# Patient Record
Sex: FEMALE | Race: WHITE | NOT HISPANIC OR LATINO | Employment: OTHER | URBAN - METROPOLITAN AREA
[De-identification: names, ages, dates, MRNs, and addresses within clinical notes are randomized per-mention and may not be internally consistent; named-entity substitution may affect disease eponyms.]

---

## 2017-07-06 ENCOUNTER — OFFICE VISIT (OUTPATIENT)
Dept: FAMILY MEDICINE | Facility: CLINIC | Age: 72
End: 2017-07-06

## 2017-07-06 VITALS
SYSTOLIC BLOOD PRESSURE: 132 MMHG | HEIGHT: 61 IN | WEIGHT: 140 LBS | HEART RATE: 90 BPM | DIASTOLIC BLOOD PRESSURE: 80 MMHG | OXYGEN SATURATION: 98 % | TEMPERATURE: 97.8 F | RESPIRATION RATE: 18 BRPM | BODY MASS INDEX: 26.43 KG/M2

## 2017-07-06 DIAGNOSIS — I10 ESSENTIAL HYPERTENSION WITH GOAL BLOOD PRESSURE LESS THAN 140/90: ICD-10-CM

## 2017-07-06 DIAGNOSIS — R73.03 PREDIABETES: ICD-10-CM

## 2017-07-06 DIAGNOSIS — E78.00 HYPERCHOLESTEREMIA: ICD-10-CM

## 2017-07-06 DIAGNOSIS — H93.8X2 EAR CONGESTION, LEFT: ICD-10-CM

## 2017-07-06 DIAGNOSIS — Z11.59 NEED FOR HEPATITIS C SCREENING TEST: ICD-10-CM

## 2017-07-06 DIAGNOSIS — Z12.11 SPECIAL SCREENING FOR MALIGNANT NEOPLASMS, COLON: Primary | ICD-10-CM

## 2017-07-06 DIAGNOSIS — Z23 NEED FOR PNEUMOCOCCAL VACCINE: ICD-10-CM

## 2017-07-06 PROCEDURE — 99213 OFFICE O/P EST LOW 20 MIN: CPT | Mod: 25 | Performed by: FAMILY MEDICINE

## 2017-07-06 PROCEDURE — G0009 ADMIN PNEUMOCOCCAL VACCINE: HCPCS | Performed by: FAMILY MEDICINE

## 2017-07-06 PROCEDURE — 90732 PPSV23 VACC 2 YRS+ SUBQ/IM: CPT | Performed by: FAMILY MEDICINE

## 2017-07-06 PROCEDURE — 36415 COLL VENOUS BLD VENIPUNCTURE: CPT | Performed by: FAMILY MEDICINE

## 2017-07-06 NOTE — PATIENT INSTRUCTIONS
Labs today    Your left ear lateralized tuning fork  Screen tone test left ear failed 500 Hz. Schedule with audiology if not improved 2-4 weeks after ear wash  Ear wash at your request left ear

## 2017-07-06 NOTE — PROGRESS NOTES
Cc check ears, labs lipid and a1c      SUBJECTIVE:                                                    Tess Menchaca is a 71 year old female who presents to clinic today for the following health issues:      Left ear plugged her  a retired physician thought there was wax there. I did not see much. We did do ear lavage and she thought she could hear better after that. Prior to that her left ear failed at 500 Hz and tuning fork lateralized to that side. If she continues to have issues she could schedule with the audiologist here. No Tinnitus reported.     Saw Travis in November and lisinopril was increased  BP Readings from Last 3 Encounters:   07/06/17 132/80   11/04/16 138/82   07/25/16 138/72     HCM  Hep C screen- done  Colon cancer screen referred  PPSV23- done      Last Basic Metabolic Panel:  Lab Results   Component Value Date     07/25/2016      Lab Results   Component Value Date    POTASSIUM 4.8 07/25/2016     Lab Results   Component Value Date    CHLORIDE 101 07/25/2016     Lab Results   Component Value Date    KYLE 9.5 07/25/2016     Lab Results   Component Value Date    CO2 22 01/04/2012     Lab Results   Component Value Date    BUN 18 07/25/2016    BUN 25 07/25/2016     Lab Results   Component Value Date    CR 0.73 07/25/2016     Lab Results   Component Value Date     07/25/2016     LDL Cholesterol Calculated   Date Value Ref Range Status   11/09/2016 213 (H) 0 - 99 mg/dL Final   ]  Lab Results   Component Value Date    A1C 5.9 11/09/2016    A1C 5.6 06/18/2005             Problem list and histories reviewed & adjusted, as indicated.  Additional history: as documented    Patient Active Problem List   Diagnosis     Hypercholesteremia     Advance Care Planning     S/P laparoscopy     S/P endometrial ablation     Ovarian cyst     Essential hypertension with goal blood pressure less than 140/90     Prediabetes     Past Surgical History:   Procedure Laterality Date     ARTHROSCOPY KNEE       right     BREAST SURGERY      aspiration right brest - cysts     COLONOSCOPY       DILATION AND CURETTAGE, OPERATIVE HYSTEROSCOPY, COMBINED  6/21/2012    Procedure: COMBINED DILATION AND CURETTAGE, OPERATIVE HYSTEROSCOPY;;  Surgeon: Mayco Seth MD;  Location: [facility]     LAPAROSCOPIC SALPINGO-OOPHORECTOMY  6/21/2012    Procedure: LAPAROSCOPIC SALPINGO-OOPHORECTOMY;  PELVISCOPY, BILATERAL SALPINGO OOPHORECTOMY, DIAGNOSTIC HYSTEROSCOPY WITH THERMACHOICE ABLATION;  Surgeon: Mayco Seth MD;  Location: [facility]     MOHS MICROGRAPHIC PROCEDURE       RELEASE CARPAL TUNNEL      bilateral       Social History   Substance Use Topics     Smoking status: Never Smoker     Smokeless tobacco: Never Used     Alcohol use 3.5 oz/week     7 Glasses of wine per week      Comment: 8 - 14 drinks per week     No family history on file.      Current Outpatient Prescriptions   Medication Sig Dispense Refill     FLUVIRIN SUSP ADM 0.5ML IM UTD  0     lisinopril (PRINIVIL,ZESTRIL) 10 MG tablet Take 1 tablet (10 mg) by mouth daily 30 tablet 11     simvastatin (ZOCOR) 40 MG tablet Take 1 tablet (40 mg) by mouth At Bedtime 90 tablet 3     VITAMIN D, CHOLECALCIFEROL, PO Take 5,000 Units by mouth daily       estradiol (VAGIFEM) 10 MCG TABS Place 10 mcg vaginally Insert one 4x per week       conjugated estrogens (PREMARIN) vaginal cream Place  vaginally twice a week.         raloxifene (EVISTA) 60 MG tablet Take 60 mg by mouth At Bedtime.       aspirin (SB LOW DOSE ASA EC) 81 MG EC tablet Take 81 mg by mouth At Bedtime.       Multiple Vitamin (DAILY MULTIVITAMIN PO) Take 1 tablet by mouth daily.       No Known Allergies  Recent Labs   Lab Test  11/09/16   0914  07/25/16   1324  04/04/16   1102  01/04/12   0000   A1C  5.9*   --    --    --    LDL  213*   --   137*  116   HDL  69   --   73  74   TRIG  105   --   104  113   ALT   --    --   20   --    CR   --   0.73  0.76  0.8   POTASSIUM   --   4.8  4.9  4.6      BP Readings from Last 3  "Encounters:   07/06/17 132/80   11/04/16 138/82   07/25/16 138/72    Wt Readings from Last 3 Encounters:   07/06/17 63.5 kg (140 lb)   11/04/16 64 kg (141 lb)   07/25/16 65.8 kg (145 lb)       Estimated body mass index is 26.45 kg/(m^2) as calculated from the following:    Height as of this encounter: 1.549 m (5' 1\").    Weight as of this encounter: 63.5 kg (140 lb).             Labs reviewed in EPIC    Reviewed and updated as needed this visit by clinical staff       Reviewed and updated as needed this visit by Provider         ROS:  Constitutional, HEENT, cardiovascular, pulmonary, GI, , musculoskeletal, neuro, skin, endocrine and psych systems are negative, except as otherwise noted.    OBJECTIVE:     /80  Pulse 90  Temp 97.8  F (36.6  C) (Oral)  Resp 18  Ht 1.549 m (5' 1\")  Wt 63.5 kg (140 lb)  SpO2 98%  BMI 26.45 kg/m2  Body mass index is 26.45 kg/(m^2).  GENERAL: healthy, alert and no distress  EYES: Eyes grossly normal to inspection, PERRL and conjunctivae and sclerae normal  HENT: ear canals and TM's normal on Right and a little cerumen by appearance on the left, nose and mouth without ulcers or lesions  Air/Bone conduction by tuning fork testing. Lateralized to left ear. Left ear failed screen at 500 Hz before ear wash.  NECK: no adenopathy, no asymmetry, masses, or scars and thyroid normal to palpation  RESP: lungs clear to auscultation - no rales, rhonchi or wheezes  CV: regular rate and rhythm, normal S1 S2, no S3 or S4, no murmur, click or rub, no peripheral edema and peripheral pulses strong  ABDOMEN: soft, nontender, no hepatosplenomegaly, no masses and bowel sounds normal  MS: no gross musculoskeletal defects noted, no edema  SKIN: no suspicious lesions or rashes  NEURO: Normal strength and tone, mentation intact and speech normal  BACK: no CVA tenderness, no paralumbar tenderness  PSYCH: mentation appears normal, affect normal/bright  LYMPH: no cervical, supraclavicular, axillary, or " inguinal adenopathy    Diagnostic Test Results:  Results for orders placed or performed in visit on 11/09/16   Hemoglobin A1C ([facility])   Result Value Ref Range    Hemoglobin A1C 5.9 (H) 4.8 - 5.6 %    Narrative    Performed at:  01 - [facility] [address], [place], [place]  [id number]  : Prince Jimenez MD, Phone:  [phone]   Lipid Panel ([facility])   Result Value Ref Range    Cholesterol 303 (H) 100 - 199 mg/dL    Triglycerides 105 0 - 149 mg/dL    HDL Cholesterol 69 >39 mg/dL    VLDL Cholesterol Wili 21 5 - 40 mg/dL    LDL Cholesterol Calculated 213 (H) 0 - 99 mg/dL    Comment: Comment     LDL/HDL Ratio 3.1 0.0 - 3.2 ratio units    Narrative    Performed at:  01 - [facility] [address], [place], [place]  [id number]  : Prince Jimenez MD, Phone:  [phone]       ASSESSMENT/PLAN:     ASSESSMENT / PLAN:  (Z12.11) Special screening for malignant neoplasms, colon  (primary encounter diagnosis)  Comment: routine  Plan: GASTROENTEROLOGY ADULT REF PROCEDURE ONLY            (Z23) Need for pneumococcal vaccine  Comment: routine  Plan: PNEUMOCOCCAL VACCINE,ADULT,SQ OR IM            (E78.00) Hypercholesteremia  Comment:   Plan: Lipid Panel ([facility]), VENOUS COLLECTION            (R73.03) Prediabetes  Comment:   Plan: Hemoglobin A1C ([facility]), VENOUS COLLECTION            (I10) Essential hypertension with goal blood pressure less than 140/90  Comment:   BP Readings from Last 3 Encounters:   07/06/17 132/80   11/04/16 138/82   07/25/16 138/72       Plan: Basic Metabolic Panel (8) ([facility]), VENOUS         COLLECTION            (H93.8X2) Ear congestion, left  Comment: she thought she could hear better after lavage  Plan: see audiology if hearing concern continues after lavage    (Z11.59) Need for hepatitis C screening test  Comment:   Plan: HCV Antibody ([facility]), VENOUS COLLECTION                Patient Instructions   Labs today    Your left ear lateralized tuning fork  Screen tone test left  ear failed 500 Hz. Schedule with audiology if not improved 2-4 weeks after ear wash  Ear wash at your request left ear          Roselia Garza MD  [facility]

## 2017-07-06 NOTE — MR AVS SNAPSHOT
After Visit Summary   7/6/2017    Tess Menchaca    MRN: [id number]           Patient Information     Date Of Birth          1945        Visit Information        Provider Department      7/6/2017 9:45 AM Roselia Garza MD [facility]        Today's Diagnoses     Special screening for malignant neoplasms, colon    -  1    Need for pneumococcal vaccine        Hypercholesteremia        Prediabetes        Essential hypertension with goal blood pressure less than 140/90        Ear congestion, left        Need for hepatitis C screening test          Care Instructions    Labs today    Your left ear lateralized tuning fork  Screen tone test left ear failed 500 Hz. Schedule with audiology if not improved 2-4 weeks after ear wash  Ear wash at your request left ear              Follow-ups after your visit        Additional Services     GASTROENTEROLOGY ADULT REF PROCEDURE ONLY       Last Lab Result: Creatinine (mg/dL)       Date                     Value                 07/25/2016               0.73             ----------  There is no height or weight on file to calculate BMI.     Needed:  No  Language:  English    Patient will be contacted to schedule procedure.     Please be aware that coverage of these services is subject to the terms and limitations of your health insurance plan.  Call member services at your health plan with any benefit or coverage questions.  Any procedures must be performed at a [facility] facility OR coordinated by your clinic's referral office.    Please bring the following with you to your appointment:    (1) Any X-Rays, CTs or MRIs which have been performed.  Contact the facility where they were done to arrange for  prior to your scheduled appointment.    (2) List of current medications   (3) This referral request   (4) Any documents/labs given to you for this referral                  Who to contact     If you have questions or need follow up  "information about today's clinic visit or your schedule please contact [facility] directly at [phone].  Normal or non-critical lab and imaging results will be communicated to you by [employer], letter or phone within 4 business days after the clinic has received the results. If you do not hear from us within 7 days, please contact the clinic through T[employer] or phone. If you have a critical or abnormal lab result, we will notify you by phone as soon as possible.  Submit refill requests through [employer] or call your pharmacy and they will forward the refill request to us. Please allow 3 business days for your refill to be completed.          Additional Information About Your Visit        T[employer]ays[employer] Information     Ginx lets you send messages to your doctor, view your test results, renew your prescriptions, schedule appointments and more. To sign up, go to [email or web address] . Click on \"Log in\" on the left side of the screen, which will take you to the Welcome page. Then click on \"Sign up Now\" on the right side of the page.     You will be asked to enter the access code listed below, as well as some personal information. Please follow the directions to create your username and password.     Your access code is: [email or web address]  Expires: 10/4/2017 10:32 AM     Your access code will  in 90 days. If you need help or a new code, please call your [facility] clinic or [phone].        Care EveryWhere ID     This is your Care EveryWhere ID. This could be used by other organizations to access your [facility] medical records  [id number]        Your Vitals Were     Pulse Temperature Respirations Height Pulse Oximetry BMI (Body Mass Index)    90 97.8  F (36.6  C) (Oral) 18 1.549 m (5' 1\") 98% 26.45 kg/m2       Blood Pressure from Last 3 Encounters:   17 132/80   16 138/82   16 138/72    Weight from Last 3 Encounters:   17 63.5 kg (140 lb)   16 64 kg (141 lb)   16 65.8 kg " (145 lb)              We Performed the Following     Basic Metabolic Panel (8) ([facility])     GASTROENTEROLOGY ADULT REF PROCEDURE ONLY     HCV Antibody ([facility])     Hemoglobin A1C ([facility])     Lipid Panel ([facility])     PNEUMOCOCCAL VACCINE,ADULT,SQ OR IM        Primary Care Provider Office Phone # Fax #    Gabriel Robles [phone]       [facility] [address]  [facility] [phone]        Equal Access to Services     FLORECITA LEAHY : Hadii aad ku hadasho Soomaali, waaxda luqadaha, qaybta kaalmada adeegyada, waxay idiin hayaan adeeg kharash la'aan . So [facility] [phone].    ATENCIÓN: Si habla español, tiene a dhaliwal disposición servicios gratuitos de asistencia lingüística. Ll[facility] [phone].    We comply with applicable federal civil rights laws and [place] laws. We do not discriminate on the basis of race, color, national origin, age, disability sex, sexual orientation or gender identity.            Thank you!     Thank you for choosing [facility]  for your care. Our goal is always to provide you with excellent care. Hearing back from our patients is one way we can continue to improve our services. Please take a few minutes to complete the written survey that you may receive in the mail after your visit with us. Thank you!             Your Updated Medication List - Protect others around you: Learn how to safely use, store and throw away your medicines at www.disposemymeds.org.          This list is accurate as of: 7/6/17 10:37 AM.  Always use your most recent med list.                   Brand Name Dispense Instructions for use Diagnosis    conjugated estrogens cream    PREMARIN     Place  vaginally twice a week.        DAILY MULTIVITAMIN PO      Take 1 tablet by mouth daily.        estradiol 10 MCG Tabs vaginal tablet    VAGIFEM     Place 10 mcg vaginally Insert one 4x per week        EVISTA 60 MG tablet   Generic drug:  raloxifene      Take 60 mg by  mouth At Bedtime.        FLUVIRIN Susp   Generic drug:  Influenza Vac Typ A&B Surf Ant      ADM 0.5ML IM UTD        lisinopril 10 MG tablet    PRINIVIL/ZESTRIL    30 tablet    Take 1 tablet (10 mg) by mouth daily    Benign essential hypertension       SB LOW DOSE ASA EC 81 MG EC tablet   Generic drug:  aspirin      Take 81 mg by mouth At Bedtime.        simvastatin 40 MG tablet    ZOCOR    90 tablet    Take 1 tablet (40 mg) by mouth At Bedtime    Hypercholesteremia       VITAMIN D (CHOLECALCIFEROL) PO      Take 5,000 Units by mouth daily

## 2017-07-07 LAB — HBA1C MFR BLD: 6.1 % (ref 4.8–5.6)

## 2017-07-08 LAB
BUN SERPL-MCNC: 17 MG/DL (ref 8–27)
BUN/CREATININE RATIO: 21 (ref 12–28)
CALCIUM SERPL-MCNC: 9.9 MG/DL (ref 8.7–10.3)
CHLORIDE SERPLBLD-SCNC: 103 MMOL/L (ref 96–106)
CHOLEST SERPL-MCNC: 221 MG/DL (ref 100–199)
CREAT SERPL-MCNC: 0.82 MG/DL (ref 0.57–1)
EGFR IF AFRICN AM: 84 ML/MIN/1.73
EGFR IF NONAFRICN AM: 73 ML/MIN/1.73
GLUCOSE SERPL-MCNC: 106 MG/DL (ref 65–99)
HCV AB SERPL QL IA: 0.1 S/CO RATIO (ref 0–0.9)
HDLC SERPL-MCNC: 83 MG/DL
LDL/HDL RATIO: 1.5 RATIO UNITS (ref 0–3.2)
LDLC SERPL CALC-MCNC: 122 MG/DL (ref 0–99)
POTASSIUM SERPL-SCNC: 6.1 MMOL/L (ref 3.5–5.2)
SODIUM SERPL-SCNC: 142 MMOL/L (ref 134–144)
TOTAL CO2: 24 MMOL/L (ref 18–28)
TRIGL SERPL-MCNC: 78 MG/DL (ref 0–149)
VLDLC SERPL CALC-MCNC: 16 MG/DL (ref 5–40)

## 2017-07-11 ENCOUNTER — TELEPHONE (OUTPATIENT)
Dept: FAMILY MEDICINE | Facility: CLINIC | Age: 72
End: 2017-07-11

## 2017-07-11 DIAGNOSIS — E87.5 SERUM POTASSIUM ELEVATED: Primary | ICD-10-CM

## 2017-07-11 DIAGNOSIS — R73.03 PRE-DIABETES: ICD-10-CM

## 2017-07-11 NOTE — TELEPHONE ENCOUNTER
Called patient with lab results.  Informed her of Hep C negative and lipids mildly elevated but better than last draw .  A1C is increased and patient will avoid sugars and carbs and foster in 3 months.  Also potassium was high.  Patient will foster tomorrow as leaving for out of town Thursday.

## 2017-07-12 DIAGNOSIS — E87.5 SERUM POTASSIUM ELEVATED: ICD-10-CM

## 2017-07-12 PROCEDURE — 36415 COLL VENOUS BLD VENIPUNCTURE: CPT | Performed by: FAMILY MEDICINE

## 2017-07-12 NOTE — LETTER
[facility] [address] [place]  [address]  Phone: [phone]    July 14, 2017      Tess Menchaca  [address] [place] [phone]              Dear Tess,    Please call with results, which are within the range we expected. The patient may continue her current plan of care.         Sincerely,     Gabriel Robles M.D.    Results for orders placed or performed in visit on 07/12/17   Potassium  Serum ([facility])   Result Value Ref Range    Potassium 5.1 3.5 - 5.2 mmol/L    Narrative    Performed at:  01 - [facility] [address], [place], [place]  [id number]  : Prince Jimenez MD, Phone:  [phone]

## 2017-07-13 LAB — POTASSIUM SERPL-SCNC: 5.1 MMOL/L (ref 3.5–5.2)

## 2017-09-06 DIAGNOSIS — E78.00 HYPERCHOLESTEREMIA: ICD-10-CM

## 2017-09-07 RX ORDER — SIMVASTATIN 40 MG
TABLET ORAL
Qty: 90 TABLET | Refills: 0 | Status: SHIPPED | OUTPATIENT
Start: 2017-09-07 | End: 2017-11-07

## 2017-09-26 ENCOUNTER — TRANSFERRED RECORDS (OUTPATIENT)
Dept: FAMILY MEDICINE | Facility: CLINIC | Age: 72
End: 2017-09-26

## 2017-10-12 DIAGNOSIS — I10 BENIGN ESSENTIAL HYPERTENSION: ICD-10-CM

## 2017-10-12 RX ORDER — LISINOPRIL 10 MG/1
TABLET ORAL
Qty: 90 TABLET | Refills: 5 | Status: SHIPPED | OUTPATIENT
Start: 2017-10-12 | End: 2017-11-07

## 2017-10-23 DIAGNOSIS — R73.03 PRE-DIABETES: ICD-10-CM

## 2017-10-24 LAB — HBA1C MFR BLD: 5.7 % (ref 4.8–5.6)

## 2017-11-07 ENCOUNTER — OFFICE VISIT (OUTPATIENT)
Dept: FAMILY MEDICINE | Facility: CLINIC | Age: 72
End: 2017-11-07

## 2017-11-07 VITALS
DIASTOLIC BLOOD PRESSURE: 70 MMHG | SYSTOLIC BLOOD PRESSURE: 124 MMHG | HEART RATE: 100 BPM | RESPIRATION RATE: 16 BRPM | OXYGEN SATURATION: 97 % | WEIGHT: 139.4 LBS | HEIGHT: 61 IN | BODY MASS INDEX: 26.32 KG/M2

## 2017-11-07 DIAGNOSIS — E78.00 HYPERCHOLESTEREMIA: ICD-10-CM

## 2017-11-07 DIAGNOSIS — N91.2 ABSENCE OF MENSTRUATION: ICD-10-CM

## 2017-11-07 DIAGNOSIS — R73.03 PREDIABETES: ICD-10-CM

## 2017-11-07 DIAGNOSIS — I10 BENIGN ESSENTIAL HYPERTENSION: ICD-10-CM

## 2017-11-07 DIAGNOSIS — I10 ESSENTIAL HYPERTENSION WITH GOAL BLOOD PRESSURE LESS THAN 140/90: Primary | ICD-10-CM

## 2017-11-07 PROCEDURE — 99213 OFFICE O/P EST LOW 20 MIN: CPT | Performed by: FAMILY MEDICINE

## 2017-11-07 RX ORDER — RALOXIFENE HYDROCHLORIDE 60 MG/1
60 TABLET, FILM COATED ORAL AT BEDTIME
Qty: 90 TABLET | Refills: 3 | Status: SHIPPED | OUTPATIENT
Start: 2017-11-07 | End: 2018-11-16

## 2017-11-07 RX ORDER — SIMVASTATIN 40 MG
TABLET ORAL
Qty: 90 TABLET | Refills: 3 | Status: SHIPPED | OUTPATIENT
Start: 2017-11-07 | End: 2018-11-16

## 2017-11-07 RX ORDER — LISINOPRIL 10 MG/1
TABLET ORAL
Qty: 90 TABLET | Refills: 3 | Status: SHIPPED | OUTPATIENT
Start: 2017-11-07 | End: 2018-11-16

## 2017-11-07 NOTE — PROGRESS NOTES
Problem(s) Oriented visit        SUBJECTIVE:                                                    Tess Menchaca is a 72 year old female who presents to clinic today for the following health issues :    1. Essential hypertension with goal blood pressure less than 140/90  Blood presure remains well controlled when checked out of clinic.    Reviewed last 6 BP readings in chart:  BP Readings from Last 6 Encounters:   11/07/17 124/70   07/06/17 132/80   11/04/16 138/82   07/25/16 138/72   07/14/16 162/90   04/04/16 128/78       she has not experienced any significant side effects from medications for hypertension.    NO active cardiac complaints or symptoms with exercise.    2. Prediabetes  a1c down to 5.7!!         Problem list, Medication list, Allergies, and Medical/Social/Surgical histories reviewed in EPIC and updated as appropriate.   Additional history: as documented    ROS:  General and CV completed and negative except as noted above    Histories:   Patient Active Problem List   Diagnosis     Hypercholesteremia     Advance Care Planning     S/P laparoscopy     S/P endometrial ablation     Ovarian cyst     Essential hypertension with goal blood pressure less than 140/90     Prediabetes     Past Surgical History:   Procedure Laterality Date     ARTHROSCOPY KNEE      right     BREAST SURGERY      aspiration right brest - cysts     COLONOSCOPY       DILATION AND CURETTAGE, OPERATIVE HYSTEROSCOPY, COMBINED  6/21/2012    Procedure: COMBINED DILATION AND CURETTAGE, OPERATIVE HYSTEROSCOPY;;  Surgeon: Mayco Seth MD;  Location: [facility]     LAPAROSCOPIC SALPINGO-OOPHORECTOMY  6/21/2012    Procedure: LAPAROSCOPIC SALPINGO-OOPHORECTOMY;  PELVISCOPY, BILATERAL SALPINGO OOPHORECTOMY, DIAGNOSTIC HYSTEROSCOPY WITH THERMACHOICE ABLATION;  Surgeon: Mayco Seth MD;  Location: [facility]     MOHS MICROGRAPHIC PROCEDURE       RELEASE CARPAL TUNNEL      bilateral       Social History   Substance Use Topics     Smoking  "status: Never Smoker     Smokeless tobacco: Never Used     Alcohol use 3.5 oz/week     7 Glasses of wine per week      Comment: 8 - 14 drinks per week     No family history on file.        OBJECTIVE:                                                    /70  Pulse 100  Resp 16  Ht 1.549 m (5' 1\")  Wt 63.2 kg (139 lb 6.4 oz)  SpO2 97%  BMI 26.34 kg/m2  Body mass index is 26.34 kg/(m^2).   APPEARANCE: = Relaxed and in no distress  Conj/Eyelids = noninjected and lids and lashes are without inflammation  PERRLA/Irises = Pupils Round Reactive to Light and Irisis without inflammation  Neck = No anterior or posterior adenopathy appreciated.  Thyroid = Not enlarged and no masses felt  Resp effort = Calm regular breathing  Breath Sounds = Good air movement with no rales or rhonchi in any lung fields  Heart Rate, Rythym, & sounds (no Murm)  = Regular rate and rythym with no S3, S4, or murmer appreciated.  Carotid Art's = Pulses full and equal and no bruits appreciated  Abdomen = Soft, nontender, no masses, & bowel sounds in all quadrants  Liver/Spleen = Normal span and no splenomegaly noted  Digits and Nails = FROM in all finger joints, no nail dystrophy  Ext (edema) = No pretibial edema noted or elsewhere  Musculsktl =  Strength and ROM of major joints are within normal limits  SKIN = absent significant rashes or lesions   Recent/Remote Memory = Alert and Oriented x 3  Mood/Affect = Cooperative and interested     ASSESSMENT/PLAN:                                                    Assessment/Plan:  Tess was seen today for recheck medication and hypertension.    Diagnoses and all orders for this visit:    Essential hypertension with goal blood pressure less than 140/90    Prediabetes    Hypercholesteremia  -     simvastatin (ZOCOR) 40 MG tablet; TAKE 1 TABLET(40 MG) BY MOUTH AT BEDTIME    Benign essential hypertension  -     lisinopril (PRINIVIL/ZESTRIL) 10 MG tablet; TAKE 1 TABLET(10 MG) BY MOUTH DAILY    Absence of " menstruation  -     raloxifene (EVISTA) 60 MG tablet; Take 1 tablet (60 mg) by mouth At Bedtime      Gabriel Robles MD  [facility]  [phone]                The following health maintenance items are reviewed in Epic and correct as of today:  Health Maintenance   Topic Date Due     COLON CANCER SCREEN (SYSTEM ASSIGNED)  08/14/1996     FALL RISK ASSESSMENT  04/04/2017     INFLUENZA VACCINE (SYSTEM ASSIGNED)  09/01/2017     MAMMO SCREEN Q2 YR (SYSTEM ASSIGNED)  09/26/2019     ADVANCE DIRECTIVE PLANNING Q5 YRS  09/09/2021     LIPID SCREEN Q5 YR FEMALE (SYSTEM ASSIGNED)  07/06/2022     TETANUS IMMUNIZATION (SYSTEM ASSIGNED)  04/04/2026     DEXA SCAN SCREENING (SYSTEM ASSIGNED)  Completed     PNEUMOCOCCAL  Completed     HEPATITIS C SCREENING  Completed       Gabriel Robles MD  [facility]  [phone]    For any issues my office # is [phone]

## 2017-11-07 NOTE — MR AVS SNAPSHOT
"              After Visit Summary   2017    Tess Menchaca    MRN: [id number]           Patient Information     Date Of Birth          1945        Visit Information        Provider Department      2017 2:00 PM Gabriel Robles MD [facility]        Today's Diagnoses     Essential hypertension with goal blood pressure less than 140/90    -  1    Prediabetes        Hypercholesteremia        Benign essential hypertension        Absence of menstruation           Follow-ups after your visit        Who to contact     If you have questions or need follow up information about today's clinic visit or your schedule please contact [facility] directly at [phone].  Normal or non-critical lab and imaging results will be communicated to you by [employer], letter or phone within 4 business days after the clinic has received the results. If you do not hear from us within 7 days, please contact the clinic through [employer] or phone. If you have a critical or abnormal lab result, we will notify you by phone as soon as possible.  Submit refill requests through GetIntent or call your pharmacy and they will forward the refill request to us. Please allow 3 business days for your refill to be completed.          Additional Information About Your Visit        MyChart Information     GetIntent lets you send messages to your doctor, view your test results, renew your prescriptions, schedule appointments and more. To sign up, go to [email or web address] . Click on \"Log in\" on the left side of the screen, which will take you to the Welcome page. Then click on \"Sign up Now\" on the right side of the page.     You will be asked to enter the access code listed below, as well as some personal information. Please follow the directions to create your username and password.     Your access code is: [email or web address]  Expires: 2018  2:56 PM     Your access code will  in 90 days. If you need help or a new " "code, please call your [place] clinic or [phone].        Care EveryWhere ID     This is your Care EveryWhere ID. This could be used by other organizations to access your [place] medical records  [id number]        Your Vitals Were     Pulse Respirations Height Pulse Oximetry BMI (Body Mass Index)       100 16 1.549 m (5' 1\") 97% 26.34 kg/m2        Blood Pressure from Last 3 Encounters:   11/07/17 124/70   07/06/17 132/80   11/04/16 138/82    Weight from Last 3 Encounters:   11/07/17 63.2 kg (139 lb 6.4 oz)   07/06/17 63.5 kg (140 lb)   11/04/16 64 kg (141 lb)              Today, you had the following     No orders found for display         Today's Medication Changes          These changes are accurate as of: 11/7/17  2:56 PM.  If you have any questions, ask your nurse or doctor.               These medicines have changed or have updated prescriptions.        Dose/Directions    lisinopril 10 MG tablet   Commonly known as:  PRINIVIL/ZESTRIL   This may have changed:  See the new instructions.   Used for:  Benign essential hypertension   Changed by:  Gabriel Robles MD        TAKE 1 TABLET(10 MG) BY MOUTH DAILY   Quantity:  90 tablet   Refills:  3       simvastatin 40 MG tablet   Commonly known as:  ZOCOR   This may have changed:  See the new instructions.   Used for:  Hypercholesteremia   Changed by:  Gabriel Robles MD        TAKE 1 TABLET(40 MG) BY MOUTH AT BEDTIME   Quantity:  90 tablet   Refills:  3            Where to get your medicines      These medications were sent to [facility] [address] [place], [place] - [address] AT [facility] [address], [facility] [address]     Phone:  [phone]     lisinopril 10 MG tablet    simvastatin 40 MG tablet         Some of these will need a paper prescription and others can be bought over the counter.  Ask your nurse if you have questions.     Bring a paper prescription for each of these medications     raloxifene 60 " MG tablet                Primary Care Provider Office Phone # Fax #    Gabriel Robles -[phone] [address]  [facility] [address]        Equal Access to Services     FLORECITA LEAHY : Freedom guevara ku verao Sosuzanne, waaxda luqadaha, qaybta kaalmada adeegyada, kristie michelle laSteffanyanh christensen. So [facility] [phone].    ATENCIÓN: Si habla español, tiene a dhaliwal disposición servicios gratuitos de asistencia lingüística. Llame al [phone].    We comply with applicable federal civil rights laws and [place] laws. We do not discriminate on the basis of race, color, national origin, age, disability, sex, sexual orientation, or gender identity.            Thank you!     Thank you for choosing [facility]  for your care. Our goal is always to provide you with excellent care. Hearing back from our patients is one way we can continue to improve our services. Please take a few minutes to complete the written survey that you may receive in the mail after your visit with us. Thank you!             Your Updated Medication List - Protect others around you: Learn how to safely use, store and throw away your medicines at www.disposemymeds.org.          This list is accurate as of: 11/7/17  2:56 PM.  Always use your most recent med list.                   Brand Name Dispense Instructions for use Diagnosis    conjugated estrogens cream    PREMARIN     Place  vaginally twice a week.        DAILY MULTIVITAMIN PO      Take 1 tablet by mouth daily.        estradiol 10 MCG Tabs vaginal tablet    VAGIFEM     Place 10 mcg vaginally Insert one 4x per week        FLUVIRIN Susp   Generic drug:  Influenza Vac Typ A&B Surf Ant      ADM 0.5ML IM UTD        lisinopril 10 MG tablet    PRINIVIL/ZESTRIL    90 tablet    TAKE 1 TABLET(10 MG) BY MOUTH DAILY    Benign essential hypertension       raloxifene 60 MG tablet    EVISTA    90 tablet    Take 1 tablet (60 mg) by mouth At Bedtime    Absence of  menstruation       SB LOW DOSE ASA EC 81 MG EC tablet   Generic drug:  aspirin      Take 81 mg by mouth At Bedtime.        simvastatin 40 MG tablet    ZOCOR    90 tablet    TAKE 1 TABLET(40 MG) BY MOUTH AT BEDTIME    Hypercholesteremia       VITAMIN D (CHOLECALCIFEROL) PO      Take 5,000 Units by mouth daily

## 2018-01-24 ENCOUNTER — APPOINTMENT (OUTPATIENT)
Age: 73
Setting detail: DERMATOLOGY
End: 2018-02-26

## 2018-01-24 DIAGNOSIS — L57.0 ACTINIC KERATOSIS: ICD-10-CM

## 2018-01-24 DIAGNOSIS — Z85.828 PERSONAL HISTORY OF OTHER MALIGNANT NEOPLASM OF SKIN: ICD-10-CM

## 2018-01-24 PROCEDURE — OTHER COUNSELING: OTHER

## 2018-01-24 PROCEDURE — 17000 DESTRUCT PREMALG LESION: CPT

## 2018-01-24 PROCEDURE — OTHER MIPS QUALITY: OTHER

## 2018-01-24 PROCEDURE — 99201: CPT | Mod: 25

## 2018-01-24 PROCEDURE — OTHER LIQUID NITROGEN: OTHER

## 2018-01-24 ASSESSMENT — LOCATION ZONE DERM
LOCATION ZONE: NOSE
LOCATION ZONE: FACE

## 2018-01-24 ASSESSMENT — LOCATION SIMPLE DESCRIPTION DERM
LOCATION SIMPLE: LEFT FOREHEAD
LOCATION SIMPLE: NOSE

## 2018-01-24 ASSESSMENT — LOCATION DETAILED DESCRIPTION DERM
LOCATION DETAILED: LEFT FOREHEAD
LOCATION DETAILED: NASAL DORSUM

## 2018-01-24 NOTE — PROCEDURE: MIPS QUALITY
Quality 131: Pain Assessment And Follow-Up: Pain assessment using a standardized tool is documented as negative, no follow-up plan required
Detail Level: Detailed
Quality 226: Preventive Care And Screening: Tobacco Use: Screening And Cessation Intervention: Patient screened for tobacco and never smoked
Quality 431: Preventive Care And Screening: Unhealthy Alcohol Use - Screening: Patient screened for unhealthy alcohol use using a single question and scores less than 2 times per year
Quality 130: Documentation Of Current Medications In The Medical Record: Current Medications Documented
Quality 110: Preventive Care And Screening: Influenza Immunization: Influenza Immunization previously received during influenza season

## 2018-01-24 NOTE — PROCEDURE: LIQUID NITROGEN
Post-Care Instructions: I reviewed with the patient in detail post-care instructions. Patient is to wear sunprotection, and avoid picking at any of the treated lesions. Pt may apply Vaseline to crusted or scabbing areas.
Duration Of Freeze Thaw-Cycle (Seconds): 10
Render Post-Care Instructions In Note?: yes
Detail Level: Simple
Number Of Freeze-Thaw Cycles: 1 freeze-thaw cycle
Consent: The patient's consent was obtained including but not limited to risks of crusting, scabbing, blistering, scarring, darker or lighter pigmentary change, recurrence, incomplete removal and infection.

## 2018-09-28 ENCOUNTER — TRANSFERRED RECORDS (OUTPATIENT)
Dept: FAMILY MEDICINE | Facility: CLINIC | Age: 73
End: 2018-09-28

## 2018-10-23 ENCOUNTER — SURGERY (OUTPATIENT)
Age: 73
End: 2018-10-23

## 2018-10-23 ENCOUNTER — HOSPITAL ENCOUNTER (OUTPATIENT)
Facility: CLINIC | Age: 73
Discharge: HOME | End: 2018-10-23
Attending: COLON & RECTAL SURGERY | Admitting: COLON & RECTAL SURGERY
Payer: MEDICARE

## 2018-10-23 VITALS
OXYGEN SATURATION: 92 % | WEIGHT: 138 LBS | RESPIRATION RATE: 12 BRPM | HEIGHT: 62 IN | BODY MASS INDEX: 25.4 KG/M2 | SYSTOLIC BLOOD PRESSURE: 106 MMHG | DIASTOLIC BLOOD PRESSURE: 78 MMHG

## 2018-10-23 LAB — COLONOSCOPY: NORMAL

## 2018-10-23 PROCEDURE — G0500 MOD SEDAT ENDO SERVICE >5YRS: HCPCS | Performed by: COLON & RECTAL SURGERY

## 2018-10-23 PROCEDURE — 45378 DIAGNOSTIC COLONOSCOPY: CPT | Performed by: COLON & RECTAL SURGERY

## 2018-10-23 PROCEDURE — G0121 COLON CA SCRN NOT HI RSK IND: HCPCS | Performed by: COLON & RECTAL SURGERY

## 2018-10-23 PROCEDURE — 25000128 H RX IP 250 OP 636: Performed by: COLON & RECTAL SURGERY

## 2018-10-23 RX ORDER — ONDANSETRON 2 MG/ML
4 INJECTION INTRAMUSCULAR; INTRAVENOUS EVERY 6 HOURS PRN
Status: DISCONTINUED | OUTPATIENT
Start: 2018-10-23 | End: 2018-10-23 | Stop reason: HOSPADM

## 2018-10-23 RX ORDER — ONDANSETRON 4 MG/1
4 TABLET, ORALLY DISINTEGRATING ORAL EVERY 6 HOURS PRN
Status: DISCONTINUED | OUTPATIENT
Start: 2018-10-23 | End: 2018-10-23 | Stop reason: HOSPADM

## 2018-10-23 RX ORDER — LIDOCAINE 40 MG/G
CREAM TOPICAL
Status: DISCONTINUED | OUTPATIENT
Start: 2018-10-23 | End: 2018-10-23 | Stop reason: HOSPADM

## 2018-10-23 RX ORDER — FLUMAZENIL 0.1 MG/ML
0.2 INJECTION, SOLUTION INTRAVENOUS
Status: DISCONTINUED | OUTPATIENT
Start: 2018-10-23 | End: 2018-10-23 | Stop reason: HOSPADM

## 2018-10-23 RX ORDER — ONDANSETRON 2 MG/ML
4 INJECTION INTRAMUSCULAR; INTRAVENOUS
Status: DISCONTINUED | OUTPATIENT
Start: 2018-10-23 | End: 2018-10-23 | Stop reason: HOSPADM

## 2018-10-23 RX ORDER — NALOXONE HYDROCHLORIDE 0.4 MG/ML
.1-.4 INJECTION, SOLUTION INTRAMUSCULAR; INTRAVENOUS; SUBCUTANEOUS
Status: DISCONTINUED | OUTPATIENT
Start: 2018-10-23 | End: 2018-10-23 | Stop reason: HOSPADM

## 2018-10-23 RX ORDER — FENTANYL CITRATE 50 UG/ML
INJECTION, SOLUTION INTRAMUSCULAR; INTRAVENOUS PRN
Status: DISCONTINUED | OUTPATIENT
Start: 2018-10-23 | End: 2018-10-23 | Stop reason: HOSPADM

## 2018-10-23 RX ADMIN — MIDAZOLAM 2 MG: 1 INJECTION INTRAMUSCULAR; INTRAVENOUS at 12:58

## 2018-10-23 RX ADMIN — FENTANYL CITRATE 100 MCG: 50 INJECTION, SOLUTION INTRAMUSCULAR; INTRAVENOUS at 13:00

## 2018-10-23 RX ADMIN — FENTANYL CITRATE 50 MCG: 50 INJECTION, SOLUTION INTRAMUSCULAR; INTRAVENOUS at 13:06

## 2018-10-23 RX ADMIN — MIDAZOLAM 1 MG: 1 INJECTION INTRAMUSCULAR; INTRAVENOUS at 13:10

## 2018-10-23 RX ADMIN — FENTANYL CITRATE 50 MCG: 50 INJECTION, SOLUTION INTRAMUSCULAR; INTRAVENOUS at 13:04

## 2018-10-23 NOTE — BRIEF OP NOTE
[facility] Brief Operative Note    Pre-operative diagnosis: SCREENING   Post-operative diagnosis normal colonoscopy, sigmoid diverticula   Procedure: Procedure(s):  COLONOSCOPY   Surgeon(s): Surgeon(s) and Role:     * Mayco Kirkpatrick MD - Primary   Estimated blood loss: * No values recorded between 10/23/2018 12:00 AM and 10/23/2018  1:22 PM *    Specimens: * No specimens in log *   Findings: normal colonoscopy, sigmoid diverticula  See provation procedure note in chart review.    Mayco Kirkpatrick MD  [facility]  [phone]

## 2018-10-23 NOTE — H&P
[facility]    History and Physical  Colon and Rectal Surgery     Date of Admission:  10/23/2018      Assessment & Plan   Tess Menchaca is a 73 year old female who presents for colonoscopy.    Indication: screening  Plan for Colonoscopy with possible biopsy, possible polypectomy. We discussed the risks, benefits and alternatives and the patient wished to proceed.    The above has been forwarded to the consulting provider.      Mayco Kirkpatrick MD  [facility]  [phone]        Code Status   Full Code    Primary Care Physician   Gabriel Robles      History is obtained from the patient    History of Present Illness   Tess Menchaca is a 73 year old female who presents for screening    Past Medical History    I have reviewed this patient's medical history and updated it with pertinent information if needed.   Past Medical History:   Diagnosis Date     Cancer (H)     basal cell carcinoma face     Depression      Hypercholesteremia 9/20/2011     Hyperlipidemia      Hypertension      Leiomyoma of uterus, unspecified      Lyme disease      Migraines      Osteopenia      Other and unspecified ovarian cyst      Post-menopausal bleeding        Past Surgical History   I have reviewed this patient's surgical history and updated it with pertinent information if needed.  Past Surgical History:   Procedure Laterality Date     ARTHROSCOPY KNEE      right     BREAST SURGERY      aspiration right brest - cysts     COLONOSCOPY       DILATION AND CURETTAGE, OPERATIVE HYSTEROSCOPY, COMBINED  6/21/2012    Procedure: COMBINED DILATION AND CURETTAGE, OPERATIVE HYSTEROSCOPY;;  Surgeon: Mayco Seth MD;  Location: [facility]     LAPAROSCOPIC SALPINGO-OOPHORECTOMY  6/21/2012    Procedure: LAPAROSCOPIC SALPINGO-OOPHORECTOMY;  PELVISCOPY, BILATERAL SALPINGO OOPHORECTOMY, DIAGNOSTIC HYSTEROSCOPY WITH THERMACHOICE ABLATION;  Surgeon: Mayco Seth MD;  Location: [facility]      MOHS MICROGRAPHIC PROCEDURE       RELEASE CARPAL TUNNEL      bilateral       Prior to Admission Medications   Prior to Admission Medications   Prescriptions Last Dose Informant Patient Reported? Taking?   FLUVIRIN SUSP Past Week  Yes Yes   Sig: ADM 0.5ML IM UTD   Multiple Vitamin (DAILY MULTIVITAMIN PO) Past Week  Yes Yes   Sig: Take 1 tablet by mouth daily.   VITAMIN D, CHOLECALCIFEROL, PO Past Week  Yes Yes   Sig: Take 5,000 Units by mouth daily   aspirin (SB LOW DOSE ASA EC) 81 MG EC tablet Past Week  Yes Yes   Sig: Take 81 mg by mouth At Bedtime.   conjugated estrogens (PREMARIN) vaginal cream Unknown  Yes No   Sig: Place  vaginally twice a week.     estradiol (VAGIFEM) 10 MCG TABS Unknown  Yes No   Sig: Place 10 mcg vaginally Insert one 4x per week   lisinopril (PRINIVIL/ZESTRIL) 10 MG tablet 10/22/2018  No Yes   Sig: TAKE 1 TABLET(10 MG) BY MOUTH DAILY   raloxifene (EVISTA) 60 MG tablet 10/22/2018  No Yes   Sig: Take 1 tablet (60 mg) by mouth At Bedtime   simvastatin (ZOCOR) 40 MG tablet 10/22/2018  No Yes   Sig: TAKE 1 TABLET(40 MG) BY MOUTH AT BEDTIME      Facility-Administered Medications: None     Allergies   No Known Allergies    Social History   I have reviewed this patient's social history and updated it with pertinent information if needed. Tess Menchaca  reports that she has never smoked. She has never used smokeless tobacco. She reports that she drinks about 3.5 oz of alcohol per week  She reports that she does not use illicit drugs.    Family History   I have reviewed this patient's family history and updated it with pertinent information if needed.   History reviewed. No pertinent family history.    Review of Systems   CONSTITUTIONAL: NEGATIVE for fever, chills, change in weight  ENT/MOUTH: NEGATIVE for ear, mouth and throat problems  RESP: NEGATIVE for significant cough or SOB  CV: NEGATIVE for chest pain, palpitations or peripheral edema    Physical Exam       BP: (!) 143/106   Heart Rate: 113  Resp: 21 SpO2: 96 %      Vital Signs with Ranges  Heart Rate:  [113] 113  Resp:  [21] 21  BP: (143)/(106) 143/106  SpO2:  [96 %] 96 %  138 lbs 0 oz    Constitutional: awake, alert, cooperative, no apparent distress, and appears stated age  AIRWAY EXAM: Mallampatti Class I (visualization of the soft palate, fauces, uvula, anterior and posterior pillars)  Respiratory: No increased work of breathing, good air exchange, clear to auscultation bilaterally, no crackles or wheezing  Cardiovascular: Normal apical impulse, regular rate and rhythm, normal S1 and S2, no S3 or S4, and no murmur noted  ASA Class: 2 - Mild systemic disease

## 2018-11-09 DIAGNOSIS — E78.00 HYPERCHOLESTEREMIA: Primary | ICD-10-CM

## 2018-11-09 DIAGNOSIS — I10 ESSENTIAL HYPERTENSION WITH GOAL BLOOD PRESSURE LESS THAN 140/90: ICD-10-CM

## 2018-11-09 DIAGNOSIS — Z79.899 ENCOUNTER FOR LONG-TERM (CURRENT) USE OF MEDICATIONS: ICD-10-CM

## 2018-11-09 LAB
% GRANULOCYTES: 62.5 % (ref 42.2–75.2)
HCT VFR BLD AUTO: 42.3 % (ref 35–46)
HEMOGLOBIN: 14 G/DL (ref 11.8–15.5)
LYMPHOCYTES NFR BLD AUTO: 29.3 % (ref 20.5–51.1)
MCH RBC QN AUTO: 29.5 PG (ref 27–31)
MCHC RBC AUTO-ENTMCNC: 33.1 G/DL (ref 33–37)
MCV RBC AUTO: 89.2 FL (ref 80–100)
MONOCYTES NFR BLD AUTO: 8.2 % (ref 1.7–9.3)
PLATELET # BLD AUTO: 200 K/UL (ref 140–450)
RBC # BLD AUTO: 4.74 X10/CMM (ref 3.7–5.2)
WBC # BLD AUTO: 4.6 X10/CMM (ref 3.8–11)

## 2018-11-09 PROCEDURE — 36415 COLL VENOUS BLD VENIPUNCTURE: CPT | Performed by: FAMILY MEDICINE

## 2018-11-09 PROCEDURE — 85025 COMPLETE CBC W/AUTO DIFF WBC: CPT | Performed by: FAMILY MEDICINE

## 2018-11-09 NOTE — LETTER
[facility] [address] [place]  [address]  Phone: [phone]    November 16, 2018      Tess Menchaca  [address] [place] [id number]              Dear Tess,    The results from your recent visit showed I am writing to report that your included test results are within expected ranges. I do not suggest that we make any changes at this time.        Sincerely,     Gabriel Robles M.D.    Results for orders placed or performed in visit on 11/09/18   Comp. Metabolic Panel (14) ([facility])   Result Value Ref Range    Glucose 102 (H) 65 - 99 mg/dL    Urea Nitrogen 13 8 - 27 mg/dL    Creatinine 0.70 0.57 - 1.00 mg/dL    eGFR If NonAfricn Am 87 >59 mL/min/1.73    eGFR If Africn Am 100 >59 mL/min/1.73    BUN/Creatinine Ratio 19 12 - 28    Sodium 145 (H) 134 - 144 mmol/L    Potassium 5.2 3.5 - 5.2 mmol/L    Chloride 106 96 - 106 mmol/L    Total CO2 26 20 - 29 mmol/L    Calcium 9.3 8.7 - 10.3 mg/dL    Protein Total 6.6 6.0 - 8.5 g/dL    Albumin 4.3 3.5 - 4.8 g/dL    Globulin, Total 2.3 1.5 - 4.5 g/dL    A/G Ratio 1.9 1.2 - 2.2    Bilirubin Total 0.4 0.0 - 1.2 mg/dL    Alkaline Phosphatase 55 39 - 117 IU/L    AST 16 0 - 40 IU/L    ALT 20 0 - 32 IU/L    Narrative    Performed at:  01 - [facility] [address], [place], [place]  [id number]  : William Jerez MD, Phone:  [phone]   Lipid Panel ([facility])   Result Value Ref Range    Cholesterol 197 100 - 199 mg/dL    Triglycerides 90 0 - 149 mg/dL    HDL Cholesterol 69 >39 mg/dL    VLDL Cholesterol Wili 18 5 - 40 mg/dL    LDL Cholesterol Calculated 110 (H) 0 - 99 mg/dL    LDL/HDL Ratio 1.6 0.0 - 3.2 ratio    Narrative    Performed at:  01 - [facility] [address], [place], [place]  [id number]  : William Jerez MD, Phone:  [phone]   CBC with Diff/Plt ([facility])   Result Value Ref Range    WBC x10/cmm 4.6 3.8 - 11.0 x10/cmm    % Lymphocytes 29.3 20.5 - 51.1 %    % Monocytes 8.2 1.7 - 9.3 %    % Granulocytes 62.5 42.2  - 75.2 %    RBC x10/cmm 4.74 3.7 - 5.2 x10/cmm    Hemoglobin 14.0 11.8 - 15.5 g/dl    Hematocrit 42.3 35 - 46 %    MCV 89.2 80 - 100 fL    MCH 29.5 27.0 - 31.0 pg    MCHC 33.1 33.0 - 37.0 g/dL    Platelet Count 200 140 - 450 K/uL

## 2018-11-10 LAB
ALBUMIN SERPL-MCNC: 4.3 G/DL (ref 3.5–4.8)
ALBUMIN/GLOB SERPL: 1.9 {RATIO} (ref 1.2–2.2)
ALP SERPL-CCNC: 55 IU/L (ref 39–117)
ALT SERPL-CCNC: 20 IU/L (ref 0–32)
AST SERPL-CCNC: 16 IU/L (ref 0–40)
BILIRUB SERPL-MCNC: 0.4 MG/DL (ref 0–1.2)
BUN SERPL-MCNC: 13 MG/DL (ref 8–27)
BUN/CREATININE RATIO: 19 (ref 12–28)
CALCIUM SERPL-MCNC: 9.3 MG/DL (ref 8.7–10.3)
CHLORIDE SERPLBLD-SCNC: 106 MMOL/L (ref 96–106)
CHOLEST SERPL-MCNC: 197 MG/DL (ref 100–199)
CREAT SERPL-MCNC: 0.7 MG/DL (ref 0.57–1)
EGFR IF AFRICN AM: 100 ML/MIN/1.73
EGFR IF NONAFRICN AM: 87 ML/MIN/1.73
GLOBULIN, TOTAL: 2.3 G/DL (ref 1.5–4.5)
GLUCOSE SERPL-MCNC: 102 MG/DL (ref 65–99)
HDLC SERPL-MCNC: 69 MG/DL
LDL/HDL RATIO: 1.6 RATIO (ref 0–3.2)
LDLC SERPL CALC-MCNC: 110 MG/DL (ref 0–99)
POTASSIUM SERPL-SCNC: 5.2 MMOL/L (ref 3.5–5.2)
PROT SERPL-MCNC: 6.6 G/DL (ref 6–8.5)
SODIUM SERPL-SCNC: 145 MMOL/L (ref 134–144)
TOTAL CO2: 26 MMOL/L (ref 20–29)
TRIGL SERPL-MCNC: 90 MG/DL (ref 0–149)
VLDLC SERPL CALC-MCNC: 18 MG/DL (ref 5–40)

## 2018-11-16 ENCOUNTER — OFFICE VISIT (OUTPATIENT)
Dept: FAMILY MEDICINE | Facility: CLINIC | Age: 73
End: 2018-11-16

## 2018-11-16 VITALS
HEART RATE: 80 BPM | DIASTOLIC BLOOD PRESSURE: 82 MMHG | RESPIRATION RATE: 16 BRPM | SYSTOLIC BLOOD PRESSURE: 126 MMHG | BODY MASS INDEX: 25.61 KG/M2 | WEIGHT: 140 LBS | OXYGEN SATURATION: 97 %

## 2018-11-16 DIAGNOSIS — E78.00 HYPERCHOLESTEREMIA: ICD-10-CM

## 2018-11-16 DIAGNOSIS — I10 BENIGN ESSENTIAL HYPERTENSION: Primary | ICD-10-CM

## 2018-11-16 DIAGNOSIS — N91.2 ABSENCE OF MENSTRUATION: ICD-10-CM

## 2018-11-16 DIAGNOSIS — R73.03 PREDIABETES: ICD-10-CM

## 2018-11-16 PROCEDURE — 99214 OFFICE O/P EST MOD 30 MIN: CPT | Performed by: FAMILY MEDICINE

## 2018-11-16 RX ORDER — SIMVASTATIN 40 MG
TABLET ORAL
Qty: 90 TABLET | Refills: 3 | Status: SHIPPED | OUTPATIENT
Start: 2018-11-16 | End: 2019-10-22

## 2018-11-16 RX ORDER — RALOXIFENE HYDROCHLORIDE 60 MG/1
60 TABLET, FILM COATED ORAL AT BEDTIME
Qty: 90 TABLET | Refills: 3 | Status: SHIPPED | OUTPATIENT
Start: 2018-11-16 | End: 2019-10-22

## 2018-11-16 RX ORDER — LISINOPRIL 10 MG/1
TABLET ORAL
Qty: 90 TABLET | Refills: 3 | Status: SHIPPED | OUTPATIENT
Start: 2018-11-16 | End: 2019-10-22

## 2018-11-16 NOTE — MR AVS SNAPSHOT
"              After Visit Summary   2018    Tess Menchaca    MRN: [id number]           Patient Information     Date Of Birth          1945        Visit Information        Provider Department      2018 10:15 AM Gabriel Robles MD [facility]        Today's Diagnoses     Benign essential hypertension    -  1    Absence of menstruation        Hypercholesteremia        Prediabetes           Follow-ups after your visit        Who to contact     If you have questions or need follow up information about today's clinic visit or your schedule please contact [facility] directly at [phone].  Normal or non-critical lab and imaging results will be communicated to you by pbsihart, letter or phone within 4 business days after the clinic has received the results. If you do not hear from us within 7 days, please contact the clinic through [employer]bsihart or phone. If you have a critical or abnormal lab result, we will notify you by phone as soon as possible.  Submit refill requests through [employer] or call your pharmacy and they will forward the refill request to us. Please allow 3 business days for your refill to be completed.          Additional Information About Your Visit        MyChart Information     [employer] lets you send messages to your doctor, view your test results, renew your prescriptions, schedule appointments and more. To sign up, go to [email or web address] . Click on \"Log in\" on the left side of the screen, which will take you to the Welcome page. Then click on \"Sign up Now\" on the right side of the page.     You will be asked to enter the access code listed below, as well as some personal information. Please follow the directions to create your username and password.     Your access code is: [email or web address]  Expires: 2019 11:15 AM     Your access code will  in 90 days. If you need help or a new code, please call your [place] clinic or [phone].        Care " EveryWhere ID     This is your Care EveryWhere ID. This could be used by other organizations to access your [place] medical records  [id number]        Your Vitals Were     Pulse Respirations Pulse Oximetry BMI (Body Mass Index)          80 16 97% 25.61 kg/m2         Blood Pressure from Last 3 Encounters:   11/16/18 126/82   10/23/18 106/78   11/07/17 124/70    Weight from Last 3 Encounters:   11/16/18 63.5 kg (140 lb)   10/23/18 62.6 kg (138 lb)   11/07/17 63.2 kg (139 lb 6.4 oz)              Today, you had the following     No orders found for display         Today's Medication Changes          These changes are accurate as of 11/16/18 11:15 AM.  If you have any questions, ask your nurse or doctor.               Stop taking these medicines if you haven't already. Please contact your care team if you have questions.     conjugated estrogens cream   Commonly known as:  PREMARIN   Stopped by:  Gabriel Robles MD           estradiol 10 MCG Tabs vaginal tablet   Commonly known as:  VAGIFEM   Stopped by:  Gabriel Robles MD                Where to get your medicines      These medications were sent to [facility] [address] [place] [address] [address] [facility] [address] [facility] [address]     Phone:  [phone]     lisinopril 10 MG tablet    raloxifene 60 MG tablet    simvastatin 40 MG tablet                Primary Care Provider Office Phone # Fax #    Gabriel Robles [phone] [address]  [facility] [address]        Equal Access to Services     [facility] Medical CenterWHITNEY : Hadii ismael dao Sosuzanne, waaxda luqadaha, qaybta kaalkristie alvarez. So [facility] [phone].    ATENCIÓN: Si habla español, tiene a dhaliwal disposición servicios gratuitos de asistencia lingüística. Monica al [phone].    We comply with applicable federal civil rights laws and [place] laws. We do not discriminate on  the basis of race, color, national origin, age, disability, sex, sexual orientation, or gender identity.            Thank you!     Thank you for choosing [facility]  for your care. Our goal is always to provide you with excellent care. Hearing back from our patients is one way we can continue to improve our services. Please take a few minutes to complete the written survey that you may receive in the mail after your visit with us. Thank you!             Your Updated Medication List - Protect others around you: Learn how to safely use, store and throw away your medicines at www.disposemymeds.org.          This list is accurate as of 11/16/18 11:15 AM.  Always use your most recent med list.                   Brand Name Dispense Instructions for use Diagnosis    DAILY MULTIVITAMIN PO      Take 1 tablet by mouth daily.        lisinopril 10 MG tablet    PRINIVIL/ZESTRIL    90 tablet    TAKE 1 TABLET(10 MG) BY MOUTH DAILY    Benign essential hypertension       raloxifene 60 MG tablet    EVISTA    90 tablet    Take 1 tablet (60 mg) by mouth At Bedtime    Absence of menstruation       SB LOW DOSE ASA EC 81 MG EC tablet   Generic drug:  aspirin      Take 81 mg by mouth At Bedtime.        simvastatin 40 MG tablet    ZOCOR    90 tablet    TAKE 1 TABLET(40 MG) BY MOUTH AT BEDTIME    Hypercholesteremia       VITAMIN D (CHOLECALCIFEROL) PO      Take 5,000 Units by mouth daily

## 2018-11-16 NOTE — PROGRESS NOTES
Insomnia Medication  At this time your provider has determined that short-term use of a sleeping pill is appropriate.  The FDA recommends that sleeping pills are meant for short-term use only and your provider intends to help you find other ways to manage your insomnia.  Your provider will be directing you towards other treatment options for insomnia.  Please keep in touch with your primary care physician or our 'Sleep Therapy Management' team to let us know how you are doing.  You can expect a call or some type of follow up within a few weeks.      Blood presure remains well controlled when checked out of clinic.    Reviewed last 6 BP readings in chart:  BP Readings from Last 6 Encounters:   11/16/18 126/82   10/23/18 106/78   11/07/17 124/70   07/06/17 132/80   11/04/16 138/82   07/25/16 138/72       she has not experienced any significant side effects from medications for hypertension.    NO active cardiac complaints or symptoms with exercise.    Has history of hyperlipidemia.  On statin for this, denies any significant side effects of this medication.      Latest labs reviewed:    Recent Labs   Lab Test  11/09/18   0932  07/06/17   1052   01/04/12   0000   CHOL  197  221*   < >   --    HDL  69  83   < >  74   LDL  110*  122*   < >  116   TRIG  90  78   < >  113   CHOLHDLRATIO   --    --    --   2.9    < > = values in this interval not displayed.        Lab Results   Component Value Date    AST 16 11/09/2018      Problem(s) Oriented visit      ROS:  General and Resp. completed and negative except as noted above     HISTORY:   reports that she drinks about 3.5 oz of alcohol per week    reports that she has never smoked. She has never used smokeless tobacco.    Past Medical History:   Diagnosis Date     Cancer (H)      Depression      Hypercholesteremia 9/20/2011     Hyperlipidemia      Hypertension      Leiomyoma of uterus, unspecified      Lyme disease      Migraines      Osteopenia      Other and unspecified  ovarian cyst      Post-menopausal bleeding      Past Surgical History:   Procedure Laterality Date     ARTHROSCOPY KNEE      right     BREAST SURGERY      aspiration right brest - cysts     COLONOSCOPY       COLONOSCOPY N/A 10/23/2018    Procedure: COLONOSCOPY;  Surgeon: Mayco Kirkpatrick MD;  Location:  GI     DILATION AND CURETTAGE, OPERATIVE HYSTEROSCOPY, COMBINED  6/21/2012    Procedure: COMBINED DILATION AND CURETTAGE, OPERATIVE HYSTEROSCOPY;;  Surgeon: Mayco Seth MD;  Location: [facility]     LAPAROSCOPIC SALPINGO-OOPHORECTOMY  6/21/2012    Procedure: LAPAROSCOPIC SALPINGO-OOPHORECTOMY;  PELVISCOPY, BILATERAL SALPINGO OOPHORECTOMY, DIAGNOSTIC HYSTEROSCOPY WITH THERMACHOICE ABLATION;  Surgeon: Mayco Seth MD;  Location: [facility]     MOHS MICROGRAPHIC PROCEDURE       RELEASE CARPAL TUNNEL      bilateral       EXAM:  BP: 126/82   Pulse: 80    Temp: Data Unavailable    Wt Readings from Last 2 Encounters:   11/16/18 63.5 kg (140 lb)   10/23/18 62.6 kg (138 lb)       BMI= Body mass index is 25.61 kg/(m^2).    EXAM:  APPEARANCE: = Relaxed and in no distress  Conj/Eyelids = noninjected and lids and lashes are without inflammation  PERRLA/Irises = Pupils Round Reactive to Light and Irisis without inflammation  Neck = No anterior or posterior adenopathy appreciated.  Thyroid = Not enlarged and no masses felt  Resp effort = Calm regular breathing  Breath Sounds = Good air movement with no rales or rhonchi in any lung fields  Heart Rate, Rythym, & sounds (no Murm)  = Regular rate and rythym with no S3, S4, or murmer appreciated.  Carotid Art's = Pulses full and equal and no bruits appreciated  Abdomen = Soft, nontender, no masses, & bowel sounds in all quadrants  Liver/Spleen = Normal span and no splenomegaly noted  Digits and Nails = FROM in all finger joints, no nail dystrophy  Ext (edema) = No pretibial edema noted or elsewhere  Musculsktl =  Strength and ROM of major joints are within normal  "limits  SKIN = absent significant rashes or lesions   Recent/Remote Memory = Alert and Oriented x 3  Mood/Affect = Cooperative and interested      Assessment/Plan:  Tess was seen today for recheck medication.    Diagnoses and all orders for this visit:    Benign essential hypertension  -     lisinopril (PRINIVIL/ZESTRIL) 10 MG tablet; TAKE 1 TABLET(10 MG) BY MOUTH DAILY    Absence of menstruation  -     raloxifene (EVISTA) 60 MG tablet; Take 1 tablet (60 mg) by mouth At Bedtime    Hypercholesteremia  -     simvastatin (ZOCOR) 40 MG tablet; TAKE 1 TABLET(40 MG) BY MOUTH AT BEDTIME    Prediabetes        COUNSELING:   reports that she has never smoked. She has never used smokeless tobacco.    Estimated body mass index is 25.61 kg/(m^2) as calculated from the following:    Height as of 10/23/18: 1.575 m (5' 2\").    Weight as of this encounter: 63.5 kg (140 lb).       Appropriate preventive services were discussed with this patient, including applicable screening as appropriate for cardiovascular disease, diabetes, osteopenia/osteoporosis, and glaucoma.  As appropriate for age/gender, discussed screening for colorectal cancer, prostate cancer, breast cancer, and cervical cancer. Checklist reviewing preventive services available has been given to the patient.    Reviewed patients plan of care and provided an AVS. The Basic Care Plan (routine screening as documented in Health Maintenance) for Tess meets the Care Plan requirement. This Care Plan has been established and reviewed with the  Patient.      The following health maintenance items are reviewed in Epic and correct as of today:  Health Maintenance   Topic Date Due     FALL RISK ASSESSMENT  04/04/2017     PHQ-2 Q1 YR  11/07/2018     MAMMO SCREEN Q2 YR (SYSTEM ASSIGNED)  09/28/2020     ADVANCE DIRECTIVE PLANNING Q5 YRS  09/09/2021     LIPID SCREEN Q5 YR FEMALE (SYSTEM ASSIGNED)  11/09/2023     TETANUS IMMUNIZATION (SYSTEM ASSIGNED)  04/04/2026     COLON CANCER SCREEN " (SYSTEM ASSIGNED)  10/23/2028     DEXA SCAN SCREENING (SYSTEM ASSIGNED)  Completed     PNEUMOCOCCAL  Completed     INFLUENZA VACCINE  Completed     HEPATITIS C SCREENING  Completed       Gabriel Robles  [facility]  For any issues my office # is [phone]            .

## 2018-11-16 NOTE — PROGRESS NOTES
Dear Tess,   I am writing to report that your included test results are within expected ranges. I do not suggest that we make any changes at this time.    Gabriel Robles M.D.

## 2018-11-27 DIAGNOSIS — E78.00 HYPERCHOLESTEREMIA: ICD-10-CM

## 2018-11-27 RX ORDER — SIMVASTATIN 40 MG
TABLET ORAL
Qty: 90 TABLET | Refills: 0 | Status: SHIPPED | OUTPATIENT
Start: 2018-11-27 | End: 2019-02-25

## 2019-01-03 DIAGNOSIS — I10 BENIGN ESSENTIAL HYPERTENSION: ICD-10-CM

## 2019-01-03 RX ORDER — LISINOPRIL 10 MG/1
TABLET ORAL
Qty: 90 TABLET | Refills: 0 | Status: SHIPPED | OUTPATIENT
Start: 2019-01-03 | End: 2019-10-22

## 2019-02-25 DIAGNOSIS — E78.00 HYPERCHOLESTEREMIA: ICD-10-CM

## 2019-02-25 RX ORDER — SIMVASTATIN 40 MG
TABLET ORAL
Qty: 90 TABLET | Refills: 0 | Status: SHIPPED | OUTPATIENT
Start: 2019-02-25 | End: 2019-10-22

## 2019-07-11 ENCOUNTER — APPOINTMENT (OUTPATIENT)
Age: 74
Setting detail: DERMATOLOGY
End: 2019-07-20

## 2019-07-11 DIAGNOSIS — Z71.89 OTHER SPECIFIED COUNSELING: ICD-10-CM

## 2019-07-11 DIAGNOSIS — D485 NEOPLASM OF UNCERTAIN BEHAVIOR OF SKIN: ICD-10-CM

## 2019-07-11 DIAGNOSIS — L82.1 OTHER SEBORRHEIC KERATOSIS: ICD-10-CM

## 2019-07-11 DIAGNOSIS — L57.0 ACTINIC KERATOSIS: ICD-10-CM

## 2019-07-11 PROBLEM — D48.5 NEOPLASM OF UNCERTAIN BEHAVIOR OF SKIN: Status: ACTIVE | Noted: 2019-07-11

## 2019-07-11 PROCEDURE — OTHER SUNSCREEN TREATMENT REGIMEN: OTHER

## 2019-07-11 PROCEDURE — OTHER DIAGNOSIS COMMENT: OTHER

## 2019-07-11 PROCEDURE — OTHER BIOPSY BY SHAVE METHOD: OTHER

## 2019-07-11 PROCEDURE — 99212 OFFICE O/P EST SF 10 MIN: CPT | Mod: 25

## 2019-07-11 PROCEDURE — 17000 DESTRUCT PREMALG LESION: CPT | Mod: 59

## 2019-07-11 PROCEDURE — OTHER COUNSELING: OTHER

## 2019-07-11 PROCEDURE — OTHER MIPS QUALITY: OTHER

## 2019-07-11 PROCEDURE — OTHER LIQUID NITROGEN: OTHER

## 2019-07-11 PROCEDURE — 11102 TANGNTL BX SKIN SINGLE LES: CPT

## 2019-07-11 ASSESSMENT — LOCATION SIMPLE DESCRIPTION DERM
LOCATION SIMPLE: CHEST
LOCATION SIMPLE: NOSE
LOCATION SIMPLE: LEFT BREAST

## 2019-07-11 ASSESSMENT — LOCATION DETAILED DESCRIPTION DERM
LOCATION DETAILED: NASAL DORSUM
LOCATION DETAILED: LEFT MEDIAL BREAST 9-10:00 REGION
LOCATION DETAILED: LOWER STERNUM

## 2019-07-11 ASSESSMENT — LOCATION ZONE DERM
LOCATION ZONE: NOSE
LOCATION ZONE: TRUNK

## 2019-07-11 NOTE — PROCEDURE: BIOPSY BY SHAVE METHOD
Curettage Text: The wound bed was treated with curettage after the biopsy was performed.
Biopsy Type: H and E
Bill 72883 For Specimen Handling/Conveyance To Laboratory?: no
Electrodesiccation Text: The wound bed was treated with electrodesiccation after the biopsy was performed.
Biopsy Method: double edge Personna blade
Electrodesiccation And Curettage Text: The wound bed was treated with electrodesiccation and curettage after the biopsy was performed.
Wound Care: Vaseline
Detail Level: Detailed
Type Of Destruction Used: Electrodesiccation
Anesthesia Volume In Cc (Will Not Render If 0): 1.5
X Size Of Lesion In Cm: 1.1
Additional Anesthesia Volume In Cc (Will Not Render If 0): 0
Post-Care Instructions: I verbally reviewed with the patient in detail post-care instructions. Patient is to keep the biopsy site dry overnight, and then apply H2O2, Vaseline and a bandage daily until healed.
Dressing: pressure dressing with telfa
Billing Type: Third-Party Bill
Body Location Override (Optional - Billing Will Still Be Based On Selected Body Map Location If Applicable): L medial breast
Notification Instructions: Patient will be notified of biopsy results. However, patient instructed to call the office if not contacted within 2 weeks.
Silver Nitrate Text: The wound bed was treated with silver nitrate after the biopsy was performed.
Cryotherapy Text: The wound bed was treated with cryotherapy after the biopsy was performed.
Depth Of Biopsy: dermis
Hemostasis: Drysol
Anesthesia Type: 1% Xylocaine with [id number] epinephrine and sodium bicarbonate
Consent: Verbal consent was obtained and risks were reviewed including but not limited to scarring, infection, bleeding, scabbing, incomplete removal, nerve damage and allergy to anesthesia.
Was A Bandage Applied: Yes
Size Of Lesion In Cm: 1.6

## 2019-07-11 NOTE — PROCEDURE: MIPS QUALITY
Detail Level: Detailed
Quality 474: Zoster Vaccination Status: Shingrix Vaccination Administered or Previously Received
Quality 130: Documentation Of Current Medications In The Medical Record: Current Medications Documented
Quality 431: Preventive Care And Screening: Unhealthy Alcohol Use - Screening: Patient screened for unhealthy alcohol use using a single question and scores less than 2 times per year
Quality 226: Preventive Care And Screening: Tobacco Use: Screening And Cessation Intervention: Patient screened for tobacco and never smoked
Quality 131: Pain Assessment And Follow-Up: Pain assessment using a standardized tool is documented as negative, no follow-up plan required
Quality 265: Biopsy Follow-Up: Biopsy results reviewed, communicated, tracked, and documented
Quality 110: Preventive Care And Screening: Influenza Immunization: Influenza Immunization previously received during influenza season

## 2019-07-11 NOTE — PROCEDURE: LIQUID NITROGEN
Detail Level: Detailed
Render Note In Bullet Format When Appropriate: No
Post-Care Instructions: I reviewed with the patient in detail post-care instructions. Patient is to wear sunprotection, and avoid picking at any of the treated lesions. Pt may apply Vaseline to crusted or scabbing areas.
Render Post-Care Instructions In Note?: yes
Consent: The patient's consent was obtained including but not limited to risks of crusting, scabbing, blistering, scarring, darker or lighter pigmentary change, recurrence, incomplete removal and infection.
Duration Of Freeze Thaw-Cycle (Seconds): 10
Number Of Freeze-Thaw Cycles: 1 freeze-thaw cycle

## 2019-09-30 ENCOUNTER — TRANSFERRED RECORDS (OUTPATIENT)
Dept: FAMILY MEDICINE | Facility: CLINIC | Age: 74
End: 2019-09-30

## 2019-10-08 DIAGNOSIS — I10 BENIGN ESSENTIAL HYPERTENSION: ICD-10-CM

## 2019-10-08 DIAGNOSIS — E78.00 HYPERCHOLESTEREMIA: Primary | ICD-10-CM

## 2019-10-08 DIAGNOSIS — R73.03 PREDIABETES: ICD-10-CM

## 2019-10-08 LAB
% GRANULOCYTES: 66.9 % (ref 42.2–75.2)
HCT VFR BLD AUTO: 42.6 % (ref 35–46)
HEMOGLOBIN: 14.1 G/DL (ref 11.8–15.5)
LYMPHOCYTES NFR BLD AUTO: 25.6 % (ref 20.5–51.1)
MCH RBC QN AUTO: 29.8 PG (ref 27–31)
MCHC RBC AUTO-ENTMCNC: 33.2 G/DL (ref 33–37)
MCV RBC AUTO: 89.6 FL (ref 80–100)
MONOCYTES NFR BLD AUTO: 7.5 % (ref 1.7–9.3)
PLATELET # BLD AUTO: 198 K/UL (ref 140–450)
RBC # BLD AUTO: 4.75 X10/CMM (ref 3.7–5.2)
WBC # BLD AUTO: 6.5 X10/CMM (ref 3.8–11)

## 2019-10-08 PROCEDURE — 85025 COMPLETE CBC W/AUTO DIFF WBC: CPT | Performed by: FAMILY MEDICINE

## 2019-10-08 PROCEDURE — 36415 COLL VENOUS BLD VENIPUNCTURE: CPT | Performed by: FAMILY MEDICINE

## 2019-10-08 NOTE — LETTER
[facility] [address] [place]  [address]  Phone: [phone]    October 11, 2019      Tess Menchaca  [address] [place] [id number]              Dear Tess,    Here is a copy of your labs, we will discuss them at your upcoming visit.         Sincerely,     Gabriel Robles M.D.    Results for orders placed or performed in visit on 10/08/19   Hemoglobin A1C ([facility])   Result Value Ref Range    Hemoglobin A1C 5.8 (H) 4.8 - 5.6 %    Narrative    Performed at:  01 - [facility] [address], [place], [place]  [id number]  : William Jerez MD, Phone:  [phone]   CBC with Diff/Plt ([facility])   Result Value Ref Range    WBC x10/cmm 6.5 3.8 - 11.0 x10/cmm    % Lymphocytes 25.6 20.5 - 51.1 %    % Monocytes 7.5 1.7 - 9.3 %    % Granulocytes 66.9 42.2 - 75.2 %    RBC x10/cmm 4.75 3.7 - 5.2 x10/cmm    Hemoglobin 14.1 11.8 - 15.5 g/dl    Hematocrit 42.6 35 - 46 %    MCV 89.6 80 - 100 fL    MCH 29.8 27.0 - 31.0 pg    MCHC 33.2 33.0 - 37.0 g/dL    Platelet Count 198 140 - 450 K/uL   Comp. Metabolic Panel (14) ([facility])   Result Value Ref Range    Glucose 96 65 - 99 mg/dL    Urea Nitrogen 14 8 - 27 mg/dL    Creatinine 0.64 0.57 - 1.00 mg/dL    eGFR If NonAfricn Am 89 >59 mL/min/1.73    eGFR If Africn Am 102 >59 mL/min/1.73    BUN/Creatinine Ratio 22 12 - 28    Sodium 139 134 - 144 mmol/L    Potassium 4.6 3.5 - 5.2 mmol/L    Chloride 103 96 - 106 mmol/L    Total CO2 22 20 - 29 mmol/L    Calcium 9.1 8.7 - 10.3 mg/dL    Protein Total 6.7 6.0 - 8.5 g/dL    Albumin 4.5 3.5 - 4.8 g/dL    Globulin, Total 2.2 1.5 - 4.5 g/dL    A/G Ratio 2.0 1.2 - 2.2    Bilirubin Total 0.4 0.0 - 1.2 mg/dL    Alkaline Phosphatase 56 39 - 117 IU/L    AST 18 0 - 40 IU/L    ALT 19 0 - 32 IU/L    Narrative    Performed at:  01 - [facility] [address], [place], [place]  [id number]  : William Jerez MD, Phone:  [phone]   Lipid Panel ([facility])   Result Value Ref Range    Cholesterol 197 100 -  199 mg/dL    Triglycerides 83 0 - 149 mg/dL    HDL Cholesterol 71 >39 mg/dL    VLDL Cholesterol Wili 17 5 - 40 mg/dL    LDL Cholesterol Calculated 109 (H) 0 - 99 mg/dL    LDL/HDL Ratio 1.5 0.0 - 3.2 ratio    Narrative    Performed at:  01 - [facility] [address], [place], [place]  [id number]  : William Jerez MD, Phone:  [phone]

## 2019-10-08 NOTE — PROGRESS NOTES
for 10/22/19 med ck Travis - fasting comp, lipid, cbc, A1c  Ifrah Emmanuel MA October 8, 2019 2:34 PM

## 2019-10-09 LAB
ALBUMIN SERPL-MCNC: 4.5 G/DL (ref 3.5–4.8)
ALBUMIN/GLOB SERPL: 2 {RATIO} (ref 1.2–2.2)
ALP SERPL-CCNC: 56 IU/L (ref 39–117)
ALT SERPL-CCNC: 19 IU/L (ref 0–32)
AST SERPL-CCNC: 18 IU/L (ref 0–40)
BILIRUB SERPL-MCNC: 0.4 MG/DL (ref 0–1.2)
BUN SERPL-MCNC: 14 MG/DL (ref 8–27)
BUN/CREATININE RATIO: 22 (ref 12–28)
CALCIUM SERPL-MCNC: 9.1 MG/DL (ref 8.7–10.3)
CHLORIDE SERPLBLD-SCNC: 103 MMOL/L (ref 96–106)
CHOLEST SERPL-MCNC: 197 MG/DL (ref 100–199)
CREAT SERPL-MCNC: 0.64 MG/DL (ref 0.57–1)
EGFR IF AFRICN AM: 102 ML/MIN/1.73
EGFR IF NONAFRICN AM: 89 ML/MIN/1.73
GLOBULIN, TOTAL: 2.2 G/DL (ref 1.5–4.5)
GLUCOSE SERPL-MCNC: 96 MG/DL (ref 65–99)
HBA1C MFR BLD: 5.8 % (ref 4.8–5.6)
HDLC SERPL-MCNC: 71 MG/DL
LDL/HDL RATIO: 1.5 RATIO (ref 0–3.2)
LDLC SERPL CALC-MCNC: 109 MG/DL (ref 0–99)
POTASSIUM SERPL-SCNC: 4.6 MMOL/L (ref 3.5–5.2)
PROT SERPL-MCNC: 6.7 G/DL (ref 6–8.5)
SODIUM SERPL-SCNC: 139 MMOL/L (ref 134–144)
TOTAL CO2: 22 MMOL/L (ref 20–29)
TRIGL SERPL-MCNC: 83 MG/DL (ref 0–149)
VLDLC SERPL CALC-MCNC: 17 MG/DL (ref 5–40)

## 2019-10-22 ENCOUNTER — OFFICE VISIT (OUTPATIENT)
Dept: FAMILY MEDICINE | Facility: CLINIC | Age: 74
End: 2019-10-22

## 2019-10-22 VITALS
WEIGHT: 139 LBS | OXYGEN SATURATION: 97 % | SYSTOLIC BLOOD PRESSURE: 142 MMHG | HEART RATE: 77 BPM | DIASTOLIC BLOOD PRESSURE: 72 MMHG | BODY MASS INDEX: 26.24 KG/M2 | HEIGHT: 61 IN

## 2019-10-22 DIAGNOSIS — N91.2 ABSENCE OF MENSTRUATION: ICD-10-CM

## 2019-10-22 DIAGNOSIS — I10 BENIGN ESSENTIAL HYPERTENSION: ICD-10-CM

## 2019-10-22 DIAGNOSIS — E78.00 HYPERCHOLESTEREMIA: ICD-10-CM

## 2019-10-22 PROCEDURE — 99214 OFFICE O/P EST MOD 30 MIN: CPT | Performed by: FAMILY MEDICINE

## 2019-10-22 RX ORDER — RALOXIFENE HYDROCHLORIDE 60 MG/1
60 TABLET, FILM COATED ORAL AT BEDTIME
Qty: 90 TABLET | Refills: 3 | Status: SHIPPED | OUTPATIENT
Start: 2019-10-22 | End: 2021-03-03

## 2019-10-22 RX ORDER — SIMVASTATIN 40 MG
40 TABLET ORAL AT BEDTIME
Qty: 90 TABLET | Refills: 3 | Status: SHIPPED | OUTPATIENT
Start: 2019-10-22 | End: 2020-12-04

## 2019-10-22 RX ORDER — LISINOPRIL 10 MG/1
TABLET ORAL
Qty: 90 TABLET | Refills: 3 | Status: SHIPPED | OUTPATIENT
Start: 2019-10-22 | End: 2019-12-24

## 2019-10-22 ASSESSMENT — MIFFLIN-ST. JEOR: SCORE: 1072.88

## 2019-10-22 NOTE — PROGRESS NOTES
Blood presure remains well controlled when checked out of clinic.    Reviewed last 6 BP readings in chart:  BP Readings from Last 6 Encounters:   10/22/19 (!) 142/72   11/16/18 126/82   10/23/18 106/78   11/07/17 124/70   07/06/17 132/80   11/04/16 138/82       she has not experienced any significant side effects from medications for hypertension.    NO active cardiac complaints or symptoms with exercise.    Has prior diagnosis of osteopenia.  Reviewed her last DEXA scan and her current therapy.  She is on medications without reported side effects.    Has history of hyperlipidemia.  On statin for this, denies any significant side effects of this medication.      Latest labs reviewed:    Recent Labs   Lab Test 10/08/19  1010 11/09/18  0932  01/04/12  0000   CHOL 197 197   < >  --    HDL 71 69   < > 74   * 110*   < > 116   TRIG 83 90   < > 113   CHOLHDLRATIO  --   --   --  2.9    < > = values in this interval not displayed.        Lab Results   Component Value Date    AST 18 10/08/2019      Problem(s) Oriented visit      ROS:  10 point ROS completed and negative except noted above, including Gen, HEENT, CV, Resp, GI, , MS, Neurologic, Psych     HISTORY:   reports current alcohol use of about 5.8 standard drinks of alcohol per week.   reports that she has never smoked. She has never used smokeless tobacco.    Past Medical History:   Diagnosis Date     Cancer (H)      Depression      Hypercholesteremia 9/20/2011     Hyperlipidemia      Hypertension      Leiomyoma of uterus, unspecified      Lyme disease      Migraines      Osteopenia      Other and unspecified ovarian cyst      Post-menopausal bleeding      Past Surgical History:   Procedure Laterality Date     ARTHROSCOPY KNEE      right     BREAST SURGERY      aspiration right brest - cysts     COLONOSCOPY       COLONOSCOPY N/A 10/23/2018    Procedure: COLONOSCOPY;  Surgeon: Mayco Kirkpatrick MD;  Location:  GI     DILATION AND CURETTAGE, OPERATIVE  HYSTEROSCOPY, COMBINED  6/21/2012    Procedure: COMBINED DILATION AND CURETTAGE, OPERATIVE HYSTEROSCOPY;;  Surgeon: Mayco Seth MD;  Location: [facility]     LAPAROSCOPIC SALPINGO-OOPHORECTOMY  6/21/2012    Procedure: LAPAROSCOPIC SALPINGO-OOPHORECTOMY;  PELVISCOPY, BILATERAL SALPINGO OOPHORECTOMY, DIAGNOSTIC HYSTEROSCOPY WITH THERMACHOICE ABLATION;  Surgeon: Mayco Seth MD;  Location: [facility]     MOHS MICROGRAPHIC PROCEDURE       RELEASE CARPAL TUNNEL      bilateral       EXAM:  BP: 142/72   Pulse: 77    Temp: Data Unavailable    Wt Readings from Last 2 Encounters:   10/22/19 63 kg (139 lb)   11/16/18 63.5 kg (140 lb)       BMI= Body mass index is 26.26 kg/m .    EXAM:  APPEARANCE: = Relaxed and in no distress  Conj/Eyelids = noninjected and lids and lashes are without inflammation  PERRLA/Irises = Pupils Round Reactive to Light and Irisis without inflammation  Neck = No anterior or posterior adenopathy appreciated.  Thyroid = Not enlarged and no masses felt  Resp effort = Calm regular breathing  Breath Sounds = Good air movement with no rales or rhonchi in any lung fields  Heart Rate, Rythym, & sounds (no Murm)  = Regular rate and rythym with no S3, S4, or murmer appreciated.  Carotid Art's = Pulses full and equal and no bruits appreciated  Abdomen = Soft, nontender, no masses, & bowel sounds in all quadrants  Liver/Spleen = Normal span and no splenomegaly noted  Digits and Nails = FROM in all finger joints, no nail dystrophy  Ext (edema) = No pretibial edema noted or elsewhere  Musculsktl =  Strength and ROM of major joints are within normal limits  SKIN = absent significant rashes or lesions   Recent/Remote Memory = Alert and Oriented x 3  Mood/Affect = Cooperative and interested      Assessment/Plan:  Tess was seen today for recheck medication.    Diagnoses and all orders for this visit:    Benign essential hypertension  -     lisinopril (PRINIVIL/ZESTRIL) 10 MG tablet; TAKE 1 TABLET(10 MG) BY  "MOUTH DAILY    Absence of menstruation  -     raloxifene (EVISTA) 60 MG tablet; Take 1 tablet (60 mg) by mouth At Bedtime    Hypercholesteremia  -     simvastatin (ZOCOR) 40 MG tablet; Take 1 tablet (40 mg) by mouth At Bedtime        COUNSELING:   reports that she has never smoked. She has never used smokeless tobacco.    Estimated body mass index is 26.26 kg/m  as calculated from the following:    Height as of this encounter: 1.549 m (5' 1\").    Weight as of this encounter: 63 kg (139 lb).       Appropriate preventive services were discussed with this patient, including applicable screening as appropriate for cardiovascular disease, diabetes, osteopenia/osteoporosis, and glaucoma.  As appropriate for age/gender, discussed screening for colorectal cancer, prostate cancer, breast cancer, and cervical cancer. Checklist reviewing preventive services available has been given to the patient.    Reviewed patients plan of care and provided an AVS. The Basic Care Plan (routine screening as documented in Health Maintenance) for Tess meets the Care Plan requirement. This Care Plan has been established and reviewed with the  Patient.      The following health maintenance items are reviewed in Epic and correct as of today:  Health Maintenance   Topic Date Due     MEDICARE ANNUAL WELLNESS VISIT  04/04/2017     PHQ-2  01/01/2019     INFLUENZA VACCINE (1) 09/01/2019     FALL RISK ASSESSMENT  11/16/2019     ADVANCE CARE PLANNING  09/09/2021     MAMMO SCREENING  09/30/2021     LIPID  10/08/2024     DTAP/TDAP/TD IMMUNIZATION (3 - Td) 04/04/2026     COLONOSCOPY  10/23/2028     DEXA  Completed     HEPATITIS C SCREENING  Completed     PNEUMOCOCCAL IMMUNIZATION 65+ LOW/MEDIUM RISK  Completed     ZOSTER IMMUNIZATION  Completed     IPV IMMUNIZATION  Aged Out     MENINGITIS IMMUNIZATION  Aged Out       Gabriel Robles  [facility]  For any issues my office # is [phone]            "

## 2019-11-23 DIAGNOSIS — E78.00 HYPERCHOLESTEREMIA: ICD-10-CM

## 2019-11-24 RX ORDER — SIMVASTATIN 40 MG
TABLET ORAL
Qty: 90 TABLET | Refills: 0 | Status: SHIPPED | OUTPATIENT
Start: 2019-11-24 | End: 2020-02-23

## 2019-12-09 DIAGNOSIS — N91.2 ABSENCE OF MENSTRUATION: ICD-10-CM

## 2019-12-09 RX ORDER — RALOXIFENE HYDROCHLORIDE 60 MG/1
TABLET, FILM COATED ORAL
Qty: 90 TABLET | Refills: 0 | Status: SHIPPED | OUTPATIENT
Start: 2019-12-09 | End: 2020-03-08

## 2019-12-24 DIAGNOSIS — I10 BENIGN ESSENTIAL HYPERTENSION: ICD-10-CM

## 2019-12-24 RX ORDER — LISINOPRIL 10 MG/1
TABLET ORAL
Qty: 90 TABLET | Refills: 3 | Status: SHIPPED | OUTPATIENT
Start: 2019-12-24 | End: 2020-12-29

## 2020-02-22 DIAGNOSIS — E78.00 HYPERCHOLESTEREMIA: ICD-10-CM

## 2020-02-23 RX ORDER — SIMVASTATIN 40 MG
TABLET ORAL
Qty: 90 TABLET | Refills: 0 | Status: SHIPPED | OUTPATIENT
Start: 2020-02-23 | End: 2020-09-09

## 2020-03-07 DIAGNOSIS — N91.2 ABSENCE OF MENSTRUATION: ICD-10-CM

## 2020-03-08 RX ORDER — RALOXIFENE HYDROCHLORIDE 60 MG/1
TABLET, FILM COATED ORAL
Qty: 90 TABLET | Refills: 0 | Status: SHIPPED | OUTPATIENT
Start: 2020-03-08 | End: 2020-10-16

## 2020-09-09 DIAGNOSIS — E78.00 HYPERCHOLESTEREMIA: ICD-10-CM

## 2020-09-09 NOTE — TELEPHONE ENCOUNTER
SIMVASTATIN  LOV 10/22/19  LAST LABS 10/8/19    Lab Results   Component Value Date    CHOL 197 10/08/2019    CHOL 197 11/09/2018     Lab Results   Component Value Date    HDL 71 10/08/2019    HDL 69 11/09/2018     Lab Results   Component Value Date     10/08/2019     11/09/2018     Lab Results   Component Value Date    TRIG 83 10/08/2019    TRIG 90 11/09/2018     Lab Results   Component Value Date    CHOLHDLRATIO 2.9 01/04/2012    CHOLHDLRATIO 3.1 06/18/2005

## 2020-09-10 RX ORDER — SIMVASTATIN 40 MG
TABLET ORAL
Qty: 90 TABLET | Refills: 0 | Status: SHIPPED | OUTPATIENT
Start: 2020-09-10 | End: 2020-10-16

## 2020-10-13 ENCOUNTER — TRANSFERRED RECORDS (OUTPATIENT)
Dept: FAMILY MEDICINE | Facility: CLINIC | Age: 75
End: 2020-10-13

## 2020-10-16 VITALS — TEMPERATURE: 97.5 F

## 2020-10-16 DIAGNOSIS — I10 BENIGN ESSENTIAL HYPERTENSION: ICD-10-CM

## 2020-10-16 DIAGNOSIS — E78.00 HYPERCHOLESTEREMIA: Primary | ICD-10-CM

## 2020-10-16 LAB
% GRANULOCYTES: 64 % (ref 42.2–75.2)
HCT VFR BLD AUTO: 43.1 % (ref 35–46)
HEMOGLOBIN: 14.6 G/DL (ref 11.8–15.5)
LYMPHOCYTES NFR BLD AUTO: 28.8 % (ref 20.5–51.1)
MCH RBC QN AUTO: 30.1 PG (ref 27–31)
MCHC RBC AUTO-ENTMCNC: 33.9 G/DL (ref 33–37)
MCV RBC AUTO: 88.8 FL (ref 80–100)
MONOCYTES NFR BLD AUTO: 7.2 % (ref 1.7–9.3)
PLATELET # BLD AUTO: 187 K/UL (ref 140–450)
RBC # BLD AUTO: 4.85 X10/CMM (ref 3.7–5.2)
WBC # BLD AUTO: 4.8 X10/CMM (ref 3.8–11)

## 2020-10-16 PROCEDURE — 85025 COMPLETE CBC W/AUTO DIFF WBC: CPT | Performed by: FAMILY MEDICINE

## 2020-10-16 RX ORDER — COVID-19 ANTIGEN TEST
220 KIT MISCELLANEOUS DAILY
COMMUNITY
End: 2024-04-30

## 2020-10-16 NOTE — PROGRESS NOTES
fasting med ck [id number]- cbc, comp, lipid    Patient stated concerns with her answers from PHQ2- recommend to do PHQ 9 at her upcoming appt with Travis  C/o worrying much in middle of night  Insomnia  Ifrah Emmanuel MA October 16, 2020 10:53 AM

## 2020-10-16 NOTE — LETTER
[facility] [address] [place]  [address]  Phone: [phone]    October 20, 2020      Tess Menchaca  [address] [place] [phone]              Dear Tess,     I am writing to report that your included test results are within expected ranges. I do not suggest that we make any changes at this time.    Sincerely,   Gabriel Robles M.D.    Results for orders placed or performed in visit on 10/16/20   CBC with Diff/Plt ([facility])     Status: None   Result Value Ref Range    WBC x10/cmm 4.8 3.8 - 11.0 x10/cmm    % Lymphocytes 28.8 20.5 - 51.1 %    % Monocytes 7.2 1.7 - 9.3 %    % Granulocytes 64.0 42.2 - 75.2 %    RBC x10/cmm 4.85 3.7 - 5.2 x10/cmm    Hemoglobin 14.6 11.8 - 15.5 g/dl    Hematocrit 43.1 35 - 46 %    MCV 88.8 80 - 100 fL    MCH 30.1 27.0 - 31.0 pg    MCHC 33.9 33.0 - 37.0 g/dL    Platelet Count 187 140 - 450 K/uL   Comp. Metabolic Panel (14) ([facility])     Status: Abnormal   Result Value Ref Range    Glucose 100 (H) 65 - 99 mg/dL    Urea Nitrogen 15 8 - 27 mg/dL    Creatinine 0.64 0.57 - 1.00 mg/dL    eGFR If NonAfricn Am 88 >59 mL/min/1.73    eGFR If Africn Am 102 >59 mL/min/1.73    BUN/Creatinine Ratio 23 12 - 28    Sodium 143 134 - 144 mmol/L    Potassium 5.1 3.5 - 5.2 mmol/L    Chloride 104 96 - 106 mmol/L    Total CO2 28 20 - 29 mmol/L    Calcium 9.1 8.7 - 10.3 mg/dL    Protein Total 6.5 6.0 - 8.5 g/dL    Albumin 4.6 3.7 - 4.7 g/dL    Globulin, Total 1.9 1.5 - 4.5 g/dL    A/G Ratio 2.4 (H) 1.2 - 2.2    Bilirubin Total 0.5 0.0 - 1.2 mg/dL    Alkaline Phosphatase 60 39 - 117 IU/L    AST 38 0 - 40 IU/L    ALT 29 0 - 32 IU/L    Narrative    Performed at:  01 - [facility] [address], [place], [place]  [id number]  : William Jerez MD, Phone:  [phone]   Lipid Panel ([facility])     Status: Abnormal   Result Value Ref Range    Cholesterol 211 (H) 100 - 199 mg/dL    Triglycerides 99 0 - 149 mg/dL    HDL Cholesterol 76 >39 mg/dL    VLDL Cholesterol Wili 17 5 - 40  mg/dL    LDL Cholesterol Calculated 118 (H) 0 - 99 mg/dL    LDL/HDL Ratio 1.6 0.0 - 3.2 ratio    Narrative    Performed at:  01 - [facility] [address], [place], [place]  [id number]  : William Jerez MD, Phone:  [phone]

## 2020-10-17 LAB
ALBUMIN SERPL-MCNC: 4.6 G/DL (ref 3.7–4.7)
ALBUMIN/GLOB SERPL: 2.4 {RATIO} (ref 1.2–2.2)
ALP SERPL-CCNC: 60 IU/L (ref 39–117)
ALT SERPL-CCNC: 29 IU/L (ref 0–32)
AST SERPL-CCNC: 38 IU/L (ref 0–40)
BILIRUB SERPL-MCNC: 0.5 MG/DL (ref 0–1.2)
BUN SERPL-MCNC: 15 MG/DL (ref 8–27)
BUN/CREATININE RATIO: 23 (ref 12–28)
CALCIUM SERPL-MCNC: 9.1 MG/DL (ref 8.7–10.3)
CHLORIDE SERPLBLD-SCNC: 104 MMOL/L (ref 96–106)
CHOLEST SERPL-MCNC: 211 MG/DL (ref 100–199)
CREAT SERPL-MCNC: 0.64 MG/DL (ref 0.57–1)
EGFR IF AFRICN AM: 102 ML/MIN/1.73
EGFR IF NONAFRICN AM: 88 ML/MIN/1.73
GLOBULIN, TOTAL: 1.9 G/DL (ref 1.5–4.5)
GLUCOSE SERPL-MCNC: 100 MG/DL (ref 65–99)
HDLC SERPL-MCNC: 76 MG/DL
LDL/HDL RATIO: 1.6 RATIO (ref 0–3.2)
LDLC SERPL CALC-MCNC: 118 MG/DL (ref 0–99)
POTASSIUM SERPL-SCNC: 5.1 MMOL/L (ref 3.5–5.2)
PROT SERPL-MCNC: 6.5 G/DL (ref 6–8.5)
SODIUM SERPL-SCNC: 143 MMOL/L (ref 134–144)
TOTAL CO2: 28 MMOL/L (ref 20–29)
TRIGL SERPL-MCNC: 99 MG/DL (ref 0–149)
VLDLC SERPL CALC-MCNC: 17 MG/DL (ref 5–40)

## 2020-10-23 ENCOUNTER — OFFICE VISIT (OUTPATIENT)
Dept: FAMILY MEDICINE | Facility: CLINIC | Age: 75
End: 2020-10-23

## 2020-10-23 VITALS
SYSTOLIC BLOOD PRESSURE: 136 MMHG | WEIGHT: 143 LBS | OXYGEN SATURATION: 96 % | BODY MASS INDEX: 27.02 KG/M2 | HEART RATE: 104 BPM | DIASTOLIC BLOOD PRESSURE: 80 MMHG | TEMPERATURE: 97.4 F

## 2020-10-23 DIAGNOSIS — R73.03 PREDIABETES: ICD-10-CM

## 2020-10-23 DIAGNOSIS — E78.00 HYPERCHOLESTEREMIA: ICD-10-CM

## 2020-10-23 DIAGNOSIS — I10 ESSENTIAL HYPERTENSION WITH GOAL BLOOD PRESSURE LESS THAN 140/90: Primary | ICD-10-CM

## 2020-10-23 PROCEDURE — 93050 ART PRESSURE WAVEFORM ANALYS: CPT | Performed by: FAMILY MEDICINE

## 2020-10-23 PROCEDURE — 99214 OFFICE O/P EST MOD 30 MIN: CPT | Performed by: FAMILY MEDICINE

## 2020-10-23 ASSESSMENT — ANXIETY QUESTIONNAIRES
5. BEING SO RESTLESS THAT IT IS HARD TO SIT STILL: SEVERAL DAYS
IF YOU CHECKED OFF ANY PROBLEMS ON THIS QUESTIONNAIRE, HOW DIFFICULT HAVE THESE PROBLEMS MADE IT FOR YOU TO DO YOUR WORK, TAKE CARE OF THINGS AT HOME, OR GET ALONG WITH OTHER PEOPLE: NOT DIFFICULT AT ALL
7. FEELING AFRAID AS IF SOMETHING AWFUL MIGHT HAPPEN: SEVERAL DAYS
GAD7 TOTAL SCORE: 12
6. BECOMING EASILY ANNOYED OR IRRITABLE: NOT AT ALL
3. WORRYING TOO MUCH ABOUT DIFFERENT THINGS: NEARLY EVERY DAY
1. FEELING NERVOUS, ANXIOUS, OR ON EDGE: MORE THAN HALF THE DAYS
2. NOT BEING ABLE TO STOP OR CONTROL WORRYING: NEARLY EVERY DAY

## 2020-10-23 ASSESSMENT — PATIENT HEALTH QUESTIONNAIRE - PHQ9
SUM OF ALL RESPONSES TO PHQ QUESTIONS 1-9: 7
5. POOR APPETITE OR OVEREATING: MORE THAN HALF THE DAYS

## 2020-10-23 NOTE — PROGRESS NOTES
Sleep trouble lately Middle insomnia, began a few months ago, happens some nights, has been taking Tylenol PM 1-2 tabs po at bedtime as needed for insomnia like once a month.  Also takes None.    Hyperlipidemia:  Has history of hyperlipidemia.    The patient is taking a medication for this.  Denies any significant side effects from his medication.      Latest labs reviewed:    Recent Labs   Lab Test 10/16/20  0945 10/08/19  1010   CHOL 211* 197   HDL 76 71   * 109*   TRIG 99 83        Lab Results   Component Value Date    AST 38 10/16/2020        Problem(s) Oriented visit      ROS:  General and Resp. completed and negative except as noted above     HISTORY:   reports current alcohol use of about 5.8 standard drinks of alcohol per week.   reports that she has never smoked. She has never used smokeless tobacco.    Past Medical History:   Diagnosis Date     Cancer (H)      Depression      Hypercholesteremia 9/20/2011     Hyperlipidemia      Hypertension      Leiomyoma of uterus, unspecified      Lyme disease      Migraines      Osteopenia      Other and unspecified ovarian cyst      Post-menopausal bleeding      Past Surgical History:   Procedure Laterality Date     ARTHROSCOPY KNEE      right     BREAST SURGERY      aspiration right brest - cysts     COLONOSCOPY       COLONOSCOPY N/A 10/23/2018    Procedure: COLONOSCOPY;  Surgeon: Mayco Kirkpatrick MD;  Location:  GI     DILATION AND CURETTAGE, OPERATIVE HYSTEROSCOPY, COMBINED  6/21/2012    Procedure: COMBINED DILATION AND CURETTAGE, OPERATIVE HYSTEROSCOPY;;  Surgeon: Mayco Seth MD;  Location: [facility]     LAPAROSCOPIC SALPINGO-OOPHORECTOMY  6/21/2012    Procedure: LAPAROSCOPIC SALPINGO-OOPHORECTOMY;  PELVISCOPY, BILATERAL SALPINGO OOPHORECTOMY, DIAGNOSTIC HYSTEROSCOPY WITH THERMACHOICE ABLATION;  Surgeon: Mayco Seth MD;  Location: [facility]     MOHS MICROGRAPHIC PROCEDURE       RELEASE CARPAL TUNNEL      bilateral       EXAM:  BP:  136/80[AtCor[   Pulse: 104    Temp: 97.4    Wt Readings from Last 2 Encounters:   10/23/20 64.9 kg (143 lb)   10/22/19 63 kg (139 lb)       BMI= Body mass index is 27.02 kg/m .    EXAM:  APPEARANCE: = Relaxed and in no distress  Conj/Eyelids = noninjected and lids and lashes are without inflammation  PERRLA/Irises = Pupils Round Reactive to Light and Irisis without inflammation  Neck = No anterior or posterior adenopathy appreciated.  Thyroid = Not enlarged and no masses felt  Resp effort = Calm regular breathing  Breath Sounds = Good air movement with no rales or rhonchi in any lung fields  Heart Rate, Rythym, & sounds (no Murm)  = Regular rate and rythym with no S3, S4, or murmer appreciated.  Carotid Art's = Pulses full and equal and no bruits appreciated  Abdomen = Soft, nontender, no masses, & bowel sounds in all quadrants  Liver/Spleen = Normal span and no splenomegaly noted  Digits and Nails = FROM in all finger joints, no nail dystrophy  Ext (edema) = No pretibial edema noted or elsewhere  Musculsktl =  Strength and ROM of major joints are within normal limits  SKIN = absent significant rashes or lesions   Recent/Remote Memory = Alert and Oriented x 3  Mood/Affect = Cooperative and interested      Assessment/Plan:  Tess was seen today for sleep problem and recheck medication.    Diagnoses and all orders for this visit:    Essential hypertension with goal blood pressure less than 140/90  Reviewed her current HTN management. Discussed our goal for her is a systolic pressure at or below 128 and diastolic pressure at or below 83.  We today managed her prescriptions with refills ensured to ensure availabilty of current medications.  Discussed the importance for aggressive management of HTN to prevent vascular complications later.  Recommended lower fat, lower carbohydrate, and lower sodium (<2000 mg)diet. Required intervals for follow up on HTN, lab studies reviewed.    Strongly recommened she follow her blood  "pressures outside the clinic to ensure that BPs are remaining within guidelines,.  Instructed to contact me if the readings are not within guidelines on a regular basis so we can adjust treatment as needed.    -     NM ART PRESS WAVEFORM ANALYS CENTRAL ART PRESSURE    Hypercholesteremia  Hyperlipidemia  Discussed current lipid results, previous results (if available) current guidelines (NCEP) for treatment and goals for lipids.    Discussed ongoing lifestyle modification, dietary changes (low fat, low simple carb) and regular aerobic exercise.    Discussed the link between dysmetabolic syndrome and impaired glucose tolerance seen in certain patterns of lipids.     Reviewed medication use for lipid lowering, including the statins are their possible side effects of myalgias, rhabdomyolysis, and liver toxicity.  We today managed his prescriptions with refills ensured to ensure availabilty of current medications.  Discussed the importance for aggressive management of dyslipidemia to prevent vascular complications later.     Instructed to contact me if she develop any intolerance to the treatment.    Prediabetes  Fasting glucose is 100.      COUNSELING:   reports that she has never smoked. She has never used smokeless tobacco.    Estimated body mass index is 27.02 kg/m  as calculated from the following:    Height as of 10/22/19: 1.549 m (5' 1\").    Weight as of this encounter: 64.9 kg (143 lb).       Appropriate preventive services were discussed with this patient, including applicable screening as appropriate for cardiovascular disease, diabetes, osteopenia/osteoporosis, and glaucoma.  As appropriate for age/gender, discussed screening for colorectal cancer, prostate cancer, breast cancer, and cervical cancer. Checklist reviewing preventive services available has been given to the patient.    Reviewed patients plan of care and provided an AVS. The Basic Care Plan (routine screening as documented in Health Maintenance) " for Tess meets the Care Plan requirement. This Care Plan has been established and reviewed with the  Patient.      The following health maintenance items are reviewed in Epic and correct as of today:  Health Maintenance   Topic Date Due     MEDICARE ANNUAL WELLNESS VISIT  04/04/2017     ADVANCE CARE PLANNING  09/09/2021     FALL RISK ASSESSMENT  10/16/2021     MAMMO SCREENING  10/13/2022     LIPID  10/16/2025     DTAP/TDAP/TD IMMUNIZATION (3 - Td) 04/04/2026     COLORECTAL CANCER SCREENING  10/23/2028     DEXA  Completed     HEPATITIS C SCREENING  Completed     PHQ-2  Completed     INFLUENZA VACCINE  Completed     Pneumococcal Vaccine: 65+ Years  Completed     ZOSTER IMMUNIZATION  Completed     Pneumococcal Vaccine: Pediatrics (0 to 5 Years) and At-Risk Patients (6 to 64 Years)  Aged Out     IPV IMMUNIZATION  Aged Out     MENINGITIS IMMUNIZATION  Aged Out     HEPATITIS B IMMUNIZATION  Aged Out       Gabriel Robles  [facility]  For any issues my office # is [phone]

## 2020-10-23 NOTE — PATIENT INSTRUCTIONS
Hypertension   What is hypertension?   Hypertension is blood pressure that keeps being higher than normal. Blood pressure is the force of blood against artery walls as the heart pumps blood through the body. Blood pressure can be unhealthy if it is above 120/80. The higher your blood pressure, the greater the health risk.   High blood pressure can be controlled if you take these steps:   Maintain a healthy weight.   Are physically active.   Follow a healthy eating plan, which includes foods that do not have a lot of salt and sodium.   Do not drink a lot of alcohol.   Our goal is to keep the systolic (top) number 128 or lower and the diastolic (bottom) number 83 or lower    Thank you for coming in today!   If you receive a survey via My Chart, mail or phone, please let us know if there was anything you especially appreciated today or if there is any way we can improve our clinic. We value your input.     Likewise, we are working hard to attend to our digital reputation.    Please consider reviewing our clinic on [employer] and/or [employer] via the following links:    [email or web address]    We truly appreciate you taking the time to do this!    General Information:    Today you had your appointment with Gabriel Robles MD    I am in the clinic:  Monday and Friday mornings  Tuesday and Thursday afternoons    I am not in the office Wednesdays, non urgent calls received on Wednesday will be addressed when I am back in the office on Thursday.    If lab work was done today as part of your evaluation you will generally be contacted via My Chart, mail, or phone with the results within 1-5 days. If there is an alarming result we will contact you by phone. Lab results come back at varying times, I generally wait until all labs are resulted before making comments on results. Please note labs are automatically released to My Chart once  "available.    If you need refills please contact your pharmacist. They will send a refill request to me to review. Please allow 3 business days for us to process all refill requests.    Please call or send a medical message with any questions or concerns.    Thank you for choosing [facility].  We truly appreciate you trusting us with your medical care.    Your next visit with us should be scheduled for Follow up in 6 months.     Listed next are the medical health Maintenance items we are tracking for your care and when they are next due (or overdue!)  Our goal is 100% \"up to date.\" Keep this list handy to call and schedule what you are due for in the future!    Health Maintenance Due   Topic Date Due     MEDICARE ANNUAL WELLNESS VISIT  04/04/2017       Sincerely,    Gabriel Robles MD    "

## 2020-10-24 ASSESSMENT — ANXIETY QUESTIONNAIRES: GAD7 TOTAL SCORE: 12

## 2020-12-04 DIAGNOSIS — E78.00 HYPERCHOLESTEREMIA: ICD-10-CM

## 2020-12-04 RX ORDER — SIMVASTATIN 40 MG
40 TABLET ORAL AT BEDTIME
Qty: 90 TABLET | Refills: 3 | Status: SHIPPED | OUTPATIENT
Start: 2020-12-04 | End: 2021-10-26

## 2020-12-04 NOTE — TELEPHONE ENCOUNTER
Simvastatin  LOV 10/23/20    Cholesterol   Date Value Ref Range Status   10/16/2020 211 (H) 100 - 199 mg/dL Final   10/08/2019 197 100 - 199 mg/dL Final     HDL Cholesterol   Date Value Ref Range Status   10/16/2020 76 >39 mg/dL Final   10/08/2019 71 >39 mg/dL Final     LDL Cholesterol Calculated   Date Value Ref Range Status   10/16/2020 118 (H) 0 - 99 mg/dL Final   10/08/2019 109 (H) 0 - 99 mg/dL Final     Triglycerides   Date Value Ref Range Status   10/16/2020 99 0 - 149 mg/dL Final   10/08/2019 83 0 - 149 mg/dL Final     Cholesterol/HDL Ratio   Date Value Ref Range Status   01/04/2012 2.9 <4.4 CALC Final   06/18/2005 3.1 0.0 - 5.0 Final

## 2020-12-29 DIAGNOSIS — I10 BENIGN ESSENTIAL HYPERTENSION: ICD-10-CM

## 2020-12-29 RX ORDER — LISINOPRIL 10 MG/1
10 TABLET ORAL DAILY
Qty: 90 TABLET | Refills: 3 | Status: SHIPPED | OUTPATIENT
Start: 2020-12-29 | End: 2021-10-26

## 2020-12-29 NOTE — TELEPHONE ENCOUNTER
Med refill: Lisinopril    LOV: 10/23/20  Labs: 10/16/20    CBC RESULTS:   Recent Labs   Lab Test 10/16/20  0945   WBC 4.8   RBC 4.85   HGB 14.6   HCT 43.1   MCV 88.8   MCH 30.1   MCHC 33.9        Last Comprehensive Metabolic Panel:  Sodium   Date Value Ref Range Status   10/16/2020 143 134 - 144 mmol/L Final     Potassium   Date Value Ref Range Status   10/16/2020 5.1 3.5 - 5.2 mmol/L Final     Chloride   Date Value Ref Range Status   10/16/2020 104 96 - 106 mmol/L Final     Carbon Dioxide   Date Value Ref Range Status   01/04/2012 22 (L) 23 - 29 mmol/L Final     Anion Gap   Date Value Ref Range Status   06/17/2005 10 6 - 17 mmol/L Final     Glucose   Date Value Ref Range Status   10/16/2020 100 (H) 65 - 99 mg/dL Final     Urea Nitrogen   Date Value Ref Range Status   10/16/2020 15 8 - 27 mg/dL Final     BUN/Creatinine Ratio   Date Value Ref Range Status   10/16/2020 23 12 - 28 Final     Creatinine   Date Value Ref Range Status   10/16/2020 0.64 0.57 - 1.00 mg/dL Final     GFR Estimate   Date Value Ref Range Status   06/17/2005 >80 >60 mL/min/1.7m2 Final     Calcium   Date Value Ref Range Status   10/16/2020 9.1 8.7 - 10.3 mg/dL Final

## 2021-03-03 DIAGNOSIS — N91.2 ABSENCE OF MENSTRUATION: ICD-10-CM

## 2021-03-03 RX ORDER — RALOXIFENE HYDROCHLORIDE 60 MG/1
TABLET, FILM COATED ORAL
Qty: 90 TABLET | Refills: 3 | Status: SHIPPED | OUTPATIENT
Start: 2021-03-03 | End: 2022-02-28

## 2021-10-18 DIAGNOSIS — I10 ESSENTIAL HYPERTENSION WITH GOAL BLOOD PRESSURE LESS THAN 140/90: ICD-10-CM

## 2021-10-18 DIAGNOSIS — E78.00 HYPERCHOLESTEREMIA: Primary | ICD-10-CM

## 2021-10-18 LAB
% GRANULOCYTES: 57 % (ref 42.2–75.2)
HCT VFR BLD AUTO: 42 % (ref 35–46)
HEMOGLOBIN: 15 G/DL (ref 11.8–15.5)
LYMPHOCYTES NFR BLD AUTO: 34.9 % (ref 20.5–51.1)
MCH RBC QN AUTO: 30.7 PG (ref 27–31)
MCHC RBC AUTO-ENTMCNC: 35.7 G/DL (ref 33–37)
MCV RBC AUTO: 86 FL (ref 80–100)
MONOCYTES NFR BLD AUTO: 8.1 % (ref 1.7–9.3)
PLATELET # BLD AUTO: 222 K/UL (ref 140–450)
RBC # BLD AUTO: 4.88 X10/CMM (ref 3.7–5.2)
WBC # BLD AUTO: 5 X10/CMM (ref 3.8–11)

## 2021-10-18 PROCEDURE — 85025 COMPLETE CBC W/AUTO DIFF WBC: CPT | Performed by: FAMILY MEDICINE

## 2021-10-18 PROCEDURE — 36415 COLL VENOUS BLD VENIPUNCTURE: CPT | Performed by: FAMILY MEDICINE

## 2021-10-18 NOTE — LETTER
[facility] [address] [place]  [address]  Phone: [phone]    October 19, 2021      Tess Menchaca  [address] [place] [id number]              Dear Tess,     I am writing to report that your included test results are within expected ranges. I do not suggest that we make any changes at this time.     Gabriel Robles M.D./brissa       Results for orders placed or performed in visit on 10/18/21   Comp. Metabolic Panel (14) ([facility])     Status: None   Result Value Ref Range    Glucose 96 65 - 99 mg/dL    Urea Nitrogen 15 8 - 27 mg/dL    Creatinine 0.84 0.57 - 1.00 mg/dL    eGFR If NonAfricn Am 68 >59 mL/min/1.73    eGFR If Africn Am 79 >59 mL/min/1.73    BUN/Creatinine Ratio 18 12 - 28    Sodium 143 134 - 144 mmol/L    Potassium 4.9 3.5 - 5.2 mmol/L    Chloride 104 96 - 106 mmol/L    Total CO2 22 20 - 29 mmol/L    Calcium 9.4 8.7 - 10.3 mg/dL    Protein Total 6.8 6.0 - 8.5 g/dL    Albumin 4.3 3.7 - 4.7 g/dL    Globulin, Total 2.5 1.5 - 4.5 g/dL    A/G Ratio 1.7 1.2 - 2.2    Bilirubin Total 0.3 0.0 - 1.2 mg/dL    Alkaline Phosphatase 63 44 - 121 IU/L    AST 19 0 - 40 IU/L    ALT 20 0 - 32 IU/L    Narrative    Performed at:  01 - [facility] [address], [place], [place]  [id number]  : William Jerez MD, Phone:  [phone]   Lipid Panel ([facility])     Status: Abnormal   Result Value Ref Range    Cholesterol 208 (H) 100 - 199 mg/dL    Triglycerides 99 0 - 149 mg/dL    HDL Cholesterol 73 >39 mg/dL    VLDL Cholesterol Wili 17 5 - 40 mg/dL    LDL Cholesterol Calculated 118 (H) 0 - 99 mg/dL    LDL/HDL Ratio 1.6 0.0 - 3.2 ratio    Narrative    Performed at:  01 - [facility] [address], [place], [place]  [id number]  : William Jerez MD, Phone:  [phone]   [place] [place]     Status: None   Result Value Ref Range    WBC x10/cmm 5.0 3.8 - 11.0 x10/cmm    % Lymphocytes 34.9 20.5 - 51.1 %    % Monocytes 8.1 1.7 - 9.3 %    % Granulocytes 57.0 42.2 - 75.2 %     RBC x10/cmm 4.88 3.7 - 5.2 x10/cmm    Hemoglobin 15.0 11.8 - 15.5 g/dl    Hematocrit 42.0 35 - 46 %    MCV 86.0 80 - 100 fL    MCH 30.7 27.0 - 31.0 pg    MCHC 35.7 33.0 - 37.0 g/dL    Platelet Count 222 140 - 450 K/uL

## 2021-10-19 LAB
ALBUMIN SERPL-MCNC: 4.3 G/DL (ref 3.7–4.7)
ALBUMIN/GLOB SERPL: 1.7 {RATIO} (ref 1.2–2.2)
ALP SERPL-CCNC: 63 IU/L (ref 44–121)
ALT SERPL-CCNC: 20 IU/L (ref 0–32)
AST SERPL-CCNC: 19 IU/L (ref 0–40)
BILIRUB SERPL-MCNC: 0.3 MG/DL (ref 0–1.2)
BUN SERPL-MCNC: 15 MG/DL (ref 8–27)
BUN/CREATININE RATIO: 18 (ref 12–28)
CALCIUM SERPL-MCNC: 9.4 MG/DL (ref 8.7–10.3)
CHLORIDE SERPLBLD-SCNC: 104 MMOL/L (ref 96–106)
CHOLEST SERPL-MCNC: 208 MG/DL (ref 100–199)
CREAT SERPL-MCNC: 0.84 MG/DL (ref 0.57–1)
EGFR IF AFRICN AM: 79 ML/MIN/1.73
EGFR IF NONAFRICN AM: 68 ML/MIN/1.73
GLOBULIN, TOTAL: 2.5 G/DL (ref 1.5–4.5)
GLUCOSE SERPL-MCNC: 96 MG/DL (ref 65–99)
HDLC SERPL-MCNC: 73 MG/DL
LDL/HDL RATIO: 1.6 RATIO (ref 0–3.2)
LDLC SERPL CALC-MCNC: 118 MG/DL (ref 0–99)
POTASSIUM SERPL-SCNC: 4.9 MMOL/L (ref 3.5–5.2)
PROT SERPL-MCNC: 6.8 G/DL (ref 6–8.5)
SODIUM SERPL-SCNC: 143 MMOL/L (ref 134–144)
TOTAL CO2: 22 MMOL/L (ref 20–29)
TRIGL SERPL-MCNC: 99 MG/DL (ref 0–149)
VLDLC SERPL CALC-MCNC: 17 MG/DL (ref 5–40)

## 2021-10-20 ENCOUNTER — TRANSFERRED RECORDS (OUTPATIENT)
Dept: FAMILY MEDICINE | Facility: CLINIC | Age: 76
End: 2021-10-20

## 2021-10-26 ENCOUNTER — OFFICE VISIT (OUTPATIENT)
Dept: FAMILY MEDICINE | Facility: CLINIC | Age: 76
End: 2021-10-26

## 2021-10-26 VITALS
BODY MASS INDEX: 27.56 KG/M2 | HEART RATE: 107 BPM | DIASTOLIC BLOOD PRESSURE: 76 MMHG | HEIGHT: 60 IN | RESPIRATION RATE: 18 BRPM | OXYGEN SATURATION: 98 % | WEIGHT: 140.4 LBS | SYSTOLIC BLOOD PRESSURE: 147 MMHG

## 2021-10-26 DIAGNOSIS — G47.9 SLEEP DISORDER: ICD-10-CM

## 2021-10-26 DIAGNOSIS — I10 ESSENTIAL HYPERTENSION WITH GOAL BLOOD PRESSURE LESS THAN 140/90: ICD-10-CM

## 2021-10-26 DIAGNOSIS — I10 BENIGN ESSENTIAL HYPERTENSION: ICD-10-CM

## 2021-10-26 DIAGNOSIS — E78.00 HYPERCHOLESTEREMIA: ICD-10-CM

## 2021-10-26 DIAGNOSIS — Z23 NEEDS FLU SHOT: Primary | ICD-10-CM

## 2021-10-26 DIAGNOSIS — G89.29 CHRONIC RIGHT SHOULDER PAIN: ICD-10-CM

## 2021-10-26 DIAGNOSIS — M25.511 CHRONIC RIGHT SHOULDER PAIN: ICD-10-CM

## 2021-10-26 PROCEDURE — 93050 ART PRESSURE WAVEFORM ANALYS: CPT | Performed by: FAMILY MEDICINE

## 2021-10-26 PROCEDURE — 99214 OFFICE O/P EST MOD 30 MIN: CPT | Performed by: FAMILY MEDICINE

## 2021-10-26 RX ORDER — LISINOPRIL 10 MG/1
10 TABLET ORAL DAILY
Qty: 90 TABLET | Refills: 3 | Status: SHIPPED | OUTPATIENT
Start: 2021-10-26 | End: 2022-08-23

## 2021-10-26 RX ORDER — SIMVASTATIN 40 MG
40 TABLET ORAL AT BEDTIME
Qty: 90 TABLET | Refills: 3 | Status: SHIPPED | OUTPATIENT
Start: 2021-10-26 | End: 2022-08-23

## 2021-10-26 RX ORDER — TRAZODONE HYDROCHLORIDE 50 MG/1
50 TABLET, FILM COATED ORAL AT BEDTIME
Qty: 30 TABLET | Refills: 11 | Status: SHIPPED | OUTPATIENT
Start: 2021-10-26 | End: 2022-08-23

## 2021-10-26 ASSESSMENT — MIFFLIN-ST. JEOR: SCORE: 1053.35

## 2021-10-26 NOTE — PATIENT INSTRUCTIONS
Hypertension   What is hypertension?   Hypertension is blood pressure that keeps being higher than normal. Blood pressure is the force of blood against artery walls as the heart pumps blood through the body. Blood pressure can be unhealthy if it is above 120/80. The higher your blood pressure, the greater the health risk.   High blood pressure can be controlled if you take these steps:   Maintain a healthy weight.   Are physically active.   Follow a healthy eating plan, which includes foods that do not have a lot of salt and sodium.   Do not drink a lot of alcohol.   Our goal is to keep the systolic (top) number 128 or lower and the diastolic (bottom) number 83 or lower

## 2022-02-26 DIAGNOSIS — N91.2 ABSENCE OF MENSTRUATION: ICD-10-CM

## 2022-02-28 RX ORDER — RALOXIFENE HYDROCHLORIDE 60 MG/1
TABLET, FILM COATED ORAL
Qty: 90 TABLET | Refills: 3 | Status: SHIPPED | OUTPATIENT
Start: 2022-02-28 | End: 2023-03-07

## 2022-08-09 DIAGNOSIS — E78.00 HYPERCHOLESTEREMIA: Primary | ICD-10-CM

## 2022-08-09 DIAGNOSIS — I10 ESSENTIAL HYPERTENSION WITH GOAL BLOOD PRESSURE LESS THAN 140/90: ICD-10-CM

## 2022-08-09 LAB
% GRANULOCYTES: 65.8 % (ref 42.2–75.2)
CHOL/HDL RATIO (RMG): 2.6 MG/DL (ref 0–4.5)
CHOLESTEROL: 198 MG/DL (ref 100–199)
HCT VFR BLD AUTO: 43.1 % (ref 35–46)
HDL (RMG): 75 MG/DL (ref 40–?)
HEMOGLOBIN: 14.4 G/DL (ref 11.8–15.5)
LDL CALCULATED (RMG): 105 MG/DL (ref 0–130)
LYMPHOCYTES NFR BLD AUTO: 28.2 % (ref 20.5–51.1)
MCH RBC QN AUTO: 30.2 PG (ref 27–31)
MCHC RBC AUTO-ENTMCNC: 33.4 G/DL (ref 33–37)
MCV RBC AUTO: 90.6 FL (ref 80–100)
MONOCYTES NFR BLD AUTO: 6 % (ref 1.7–9.3)
PLATELET # BLD AUTO: 218 K/UL (ref 140–450)
RBC # BLD AUTO: 4.75 X10/CMM (ref 3.7–5.2)
TRIGLYCERIDES (RMG): 89 MG/DL (ref 0–149)
WBC # BLD AUTO: 5.3 X10/CMM (ref 3.8–11)

## 2022-08-09 PROCEDURE — 80061 LIPID PANEL: CPT | Mod: QW | Performed by: FAMILY MEDICINE

## 2022-08-09 PROCEDURE — 36415 COLL VENOUS BLD VENIPUNCTURE: CPT | Performed by: FAMILY MEDICINE

## 2022-08-09 PROCEDURE — 85025 COMPLETE CBC W/AUTO DIFF WBC: CPT | Performed by: FAMILY MEDICINE

## 2022-08-10 LAB
ALBUMIN SERPL-MCNC: 4.4 G/DL (ref 3.7–4.7)
ALBUMIN/GLOB SERPL: 2 {RATIO} (ref 1.2–2.2)
ALP SERPL-CCNC: 59 IU/L (ref 44–121)
ALT SERPL-CCNC: 16 IU/L (ref 0–32)
AST SERPL-CCNC: 20 IU/L (ref 0–40)
BILIRUB SERPL-MCNC: 0.3 MG/DL (ref 0–1.2)
BUN SERPL-MCNC: 13 MG/DL (ref 8–27)
BUN/CREATININE RATIO: 17 (ref 12–28)
CALCIUM SERPL-MCNC: 9.4 MG/DL (ref 8.7–10.3)
CHLORIDE SERPLBLD-SCNC: 102 MMOL/L (ref 96–106)
CREAT SERPL-MCNC: 0.75 MG/DL (ref 0.57–1)
EGFR: 83 ML/MIN/1.73
GLOBULIN, TOTAL: 2.2 G/DL (ref 1.5–4.5)
GLUCOSE SERPL-MCNC: 101 MG/DL (ref 65–99)
POTASSIUM SERPL-SCNC: 4 MMOL/L (ref 3.5–5.2)
PROT SERPL-MCNC: 6.6 G/DL (ref 6–8.5)
SODIUM SERPL-SCNC: 141 MMOL/L (ref 134–144)
TOTAL CO2: 22 MMOL/L (ref 20–29)

## 2022-08-14 ENCOUNTER — HEALTH MAINTENANCE LETTER (OUTPATIENT)
Age: 77
End: 2022-08-14

## 2022-08-19 ENCOUNTER — DOCUMENTATION ONLY (OUTPATIENT)
Dept: OTHER | Facility: CLINIC | Age: 77
End: 2022-08-19

## 2022-08-22 NOTE — PATIENT INSTRUCTIONS
Patient Education   Personalized Prevention Plan  You are due for the preventive services outlined below.  Your care team is available to assist you in scheduling these services.  If you have already completed any of these items, please share that information with your care team to update in your medical record.  Health Maintenance Due   Topic Date Due    PHQ-2 (once per calendar year)  01/01/2022

## 2022-08-22 NOTE — PROGRESS NOTES
"  SUBJECTIVE:   Tess Menchaca is a 77 year old female who presents for Preventive Visit.      Patient has been advised of split billing requirements and indicates understanding: Yes  Are you in the first 12 months of your Medicare Part B coverage?  No    Physical Health:    In general, how would you rate your overall physical health? good    Outside of work, how many days during the week do you exercise? 2-3 days/week    Outside of work, approximately how many minutes a day do you exercise?30-45 minutes  If you drink alcohol do you typically have >3 drinks per day or >7 drinks per week? Yes - AUDIT SCORE:       No flowsheet data found.    Do you usually eat at least 4 servings of fruit and vegetables a day, include whole grains & fiber and avoid regularly eating high fat or \"junk\" foods? Yes    Do you have any problems taking medications regularly?  No    Do you have any side effects from medications? none    Needs assistance for the following daily activities: no assistance needed    Which of the following safety concerns are present in your home?  lack of grab bars in the bathroom     Hearing impairment: No  HEARING FREQUENCY:   Right Ear:     500 Hz :  pass   1000 Hz:  pass   2000 Hz:  pass   4000 Hz:  pass  Left Ear:     500 Hz :  pass   1000 Hz:  pass   2000 Hz:  pass   4000 Hz:  pass  Tresa Galaviz, [facility] 8/23/2022    In the past 6 months, have you been bothered by leaking of urine? no    Mental Health:    In general, how would you rate your overall mental or emotional health? excellent  PHQ-2 Score:      Do you feel safe in your environment? Yes    Have you ever done Advance Care Planning? (For example, a Health Directive, POLST, or a discussion with a medical provider or your loved ones about your wishes): Yes, advance care planning is on file.    Additional concerns to address?  Sleep and anxiety    Fall risk:  Fallen 2 or more times in the past year?: No  Any fall with injury in the past year?: " No    Cognitive Screenin) Repeat 3 items (Leader, Season, Table)    2) Clock draw: NORMAL  3) 3 item recall: Recalls 3 objects  Results: 3 items recalled: COGNITIVE IMPAIRMENT LESS LIKELY    Mini-CogTM Copyright S Viji. Licensed by the author for use in [facility]; reprinted with permission ([email or web address]@[email or web address]). All rights reserved.      Do you have sleep apnea, excessive snoring or daytime drowsiness?: no        PROBLEMS TO ADD ON...  Hyperlipidemia:  Has history of hyperlipidemia.    The patient is taking a medication for this.  Denies any significant side effects from his medication.      Latest labs reviewed:    Recent Labs   Lab Test 10/18/21  0946 10/16/20  0945   CHOL 208* 211*   HDL 73 76   * 118*   TRIG 99 99        Lab Results   Component Value Date    AST 20 2022      Essential Hypertension   Remains well controlled when checked out of clinic.   she has not experienced any significant side effects from medications for hypertension.    NO active cardiac complaints or symptoms with exercise.  Current medications for treatment:  ACE inhibitors (Lisinopril, Ramipril,Captopril,Benazepril)    Reviewed last 6 BP readings in chart:  BP Readings from Last 6 Encounters:   22 (!) 163/89   10/26/21 (!) 147/76   10/23/20 136/80   10/22/19 (!) 142/72   18 126/82   10/23/18 106/78         Reviewed and updated as needed this visit by clinical staff   Tobacco  Allergies  Meds  Problems  Med Hx  Surg Hx  Fam Hx            Reviewed and updated as needed this visit by Provider   Tobacco  Allergies  Meds  Problems  Med Hx  Surg Hx  Fam Hx           Social History     Tobacco Use     Smoking status: Never Smoker     Smokeless tobacco: Never Used   Substance Use Topics     Alcohol use: Yes     Alcohol/week: 5.8 standard drinks     Types: 7 Glasses of wine per week     Comment: 8 - 14 drinks per week                           Current providers sharing in care for this patient  "include:   Patient Care Team:  Gabriel Robles MD as PCP - General (Family Practice)  Gabriel Robles MD as Assigned PCP    The following health maintenance items are reviewed in Epic and correct as of today:  Health Maintenance   Topic Date Due     INFLUENZA VACCINE (1) 09/01/2022     MEDICARE ANNUAL WELLNESS VISIT  08/23/2023     FALL RISK ASSESSMENT  08/23/2023     DTAP/TDAP/TD IMMUNIZATION (3 - Td or Tdap) 04/04/2026     LIPID  08/09/2027     ADVANCE CARE PLANNING  08/23/2027     DEXA  06/02/2031     HEPATITIS C SCREENING  Completed     PHQ-2 (once per calendar year)  Completed     Pneumococcal Vaccine: 65+ Years  Completed     ZOSTER IMMUNIZATION  Completed     COVID-19 Vaccine  Completed     IPV IMMUNIZATION  Aged Out     MENINGITIS IMMUNIZATION  Aged Out     HEPATITIS B IMMUNIZATION  Aged Out     Lab work is in process      ROS:  Constitutional, HEENT, cardiovascular, pulmonary, GI, , musculoskeletal, neuro, skin, endocrine and psych systems are negative, except as otherwise noted.    OBJECTIVE:   BP (!) 163/89   Pulse 86   Resp 16   Ht 1.53 m (5' 0.25\")   Wt 63.2 kg (139 lb 6.4 oz)   SpO2 96%   BMI 27.00 kg/m   Estimated body mass index is 27 kg/m  as calculated from the following:    Height as of this encounter: 1.53 m (5' 0.25\").    Weight as of this encounter: 63.2 kg (139 lb 6.4 oz).  EXAM:   GENERAL APPEARANCE: healthy, alert and no distress  EYES: Eyes grossly normal to inspection, PERRL and conjunctivae and sclerae normal  HENT: ear canals and TM's normal, nose and mouth without ulcers or lesions, oropharynx clear and oral mucous membranes moist  NECK: no adenopathy, no asymmetry, masses, or scars and thyroid normal to palpation  RESP: lungs clear to auscultation - no rales, rhonchi or wheezes  BREAST: normal without masses, tenderness or nipple discharge and no palpable axillary masses or adenopathy  CV: regular rate and rhythm, normal S1 S2, no S3 or S4, no murmur, click or rub, no " peripheral edema and peripheral pulses strong  ABDOMEN: soft, nontender, no hepatosplenomegaly, no masses and bowel sounds normal  MS: no musculoskeletal defects are noted and gait is age appropriate without ataxia  SKIN: no suspicious lesions or rashes  NEURO: Normal strength and tone, sensory exam grossly normal, mentation intact and speech normal  PSYCH: mentation appears normal and affect normal/bright    Diagnostic Test Results:  Labs reviewed in Epic    ASSESSMENT / PLAN:   Tess was seen today for recheck medication, physical, hearing screening, anxiety and sleep issues.    Diagnoses and all orders for this visit:    Encounter for Medicare annual wellness exam  Osteopenia of lumbar spine  ON [facility] AND hasnt had check for 6 years.  -     DEXA - Hip/Pelvis/Spine (FUTURE/[facility]); Future    Hypercholesteremia  Discussed current lipid results, previous results (if available) current guidelines (NCEP) for treatment and goals for lipids.  Discussed lifestyle modification, dietary changes (low fat, low simple carb) and regular aerobic exercise.  Discussed the link between dysmetabolic syndrome and impaired glucose tolerance seen in certain patterns of lipids.  Briefly discussed medication used for lipid lowering, including the statins are their possible side effects of myalgias, rhabdomyolysis, and liver toxicity.    -     simvastatin (ZOCOR) 40 MG tablet; Take 1 tablet (40 mg) by mouth At Bedtime    Benign essential hypertension  Reviewed her current HTN management. Discussed our goal for her is a systolic pressure at or below 128 and diastolic pressure at or below 83.  We today managed her prescriptions with refills ensured to ensure availabilty of current medications.  Discussed the importance for aggressive management of HTN to prevent vascular complications later.  Recommended lower fat, lower carbohydrate, and lower sodium (<2000 mg)diet. Required intervals for follow up on HTN, lab studies reviewed.   "  Strongly recommened she follow her blood pressures outside the clinic to ensure that BPs are remaining within guidelines,.  Instructed to contact me if the readings are not within guidelines on a regular basis so we can adjust treatment as needed.    -     lisinopril (ZESTRIL) 10 MG tablet; Take 1 tablet (10 mg) by mouth daily    Sleep disorder  helping  -     traZODone (DESYREL) 50 MG tablet; Take 1 tablet (50 mg) by mouth At Bedtime        Patient has been advised of split billing requirements and indicates understanding: Yes    COUNSELING:  Reviewed preventive health counseling, as reflected in patient instructions       Vision screening       Hearing screening    Estimated body mass index is 27 kg/m  as calculated from the following:    Height as of this encounter: 1.53 m (5' 0.25\").    Weight as of this encounter: 63.2 kg (139 lb 6.4 oz).        She reports that she has never smoked. She has never used smokeless tobacco.    Appropriate preventive services were discussed with this patient, including applicable screening as appropriate for cardiovascular disease, diabetes, osteopenia/osteoporosis, and glaucoma.  As appropriate for age/gender, discussed screening for colorectal cancer, prostate cancer, breast cancer, and cervical cancer. Checklist reviewing preventive services available has been given to the patient.    Reviewed patients plan of care and provided an AVS. The Basic Care Plan (routine screening as documented in Health Maintenance) for Tess meets the Care Plan requirement. This Care Plan has been established and reviewed with the Patient.    Counseling Resources:  ATP IV Guidelines  Pooled Cohorts Equation Calculator  Breast Cancer Risk Calculator  BRCA-Related Cancer Risk Assessment: FHS-7 Tool  FRAX Risk Assessment  ICSI Preventive Guidelines  Dietary Guidelines for Americans, 2010  USDA's MyPlate  ASA Prophylaxis  Lung CA Screening    Gabriel Robles MD  [facility]  "

## 2022-08-23 ENCOUNTER — OFFICE VISIT (OUTPATIENT)
Dept: FAMILY MEDICINE | Facility: CLINIC | Age: 77
End: 2022-08-23

## 2022-08-23 VITALS
WEIGHT: 139.4 LBS | SYSTOLIC BLOOD PRESSURE: 113 MMHG | OXYGEN SATURATION: 96 % | HEART RATE: 86 BPM | DIASTOLIC BLOOD PRESSURE: 72 MMHG | BODY MASS INDEX: 27.37 KG/M2 | HEIGHT: 60 IN | RESPIRATION RATE: 16 BRPM

## 2022-08-23 DIAGNOSIS — G47.9 SLEEP DISORDER: ICD-10-CM

## 2022-08-23 DIAGNOSIS — E78.00 HYPERCHOLESTEREMIA: ICD-10-CM

## 2022-08-23 DIAGNOSIS — I10 BENIGN ESSENTIAL HYPERTENSION: ICD-10-CM

## 2022-08-23 DIAGNOSIS — Z00.00 ENCOUNTER FOR MEDICARE ANNUAL WELLNESS EXAM: Primary | ICD-10-CM

## 2022-08-23 DIAGNOSIS — M85.88 OSTEOPENIA OF LUMBAR SPINE: ICD-10-CM

## 2022-08-23 DIAGNOSIS — I10 ESSENTIAL HYPERTENSION WITH GOAL BLOOD PRESSURE LESS THAN 140/90: ICD-10-CM

## 2022-08-23 PROCEDURE — 99214 OFFICE O/P EST MOD 30 MIN: CPT | Mod: 25 | Performed by: FAMILY MEDICINE

## 2022-08-23 PROCEDURE — G0439 PPPS, SUBSEQ VISIT: HCPCS | Performed by: FAMILY MEDICINE

## 2022-08-23 RX ORDER — LISINOPRIL 10 MG/1
10 TABLET ORAL DAILY
Qty: 90 TABLET | Refills: 3 | Status: SHIPPED | OUTPATIENT
Start: 2022-08-23 | End: 2023-08-24

## 2022-08-23 RX ORDER — TRAZODONE HYDROCHLORIDE 50 MG/1
50 TABLET, FILM COATED ORAL AT BEDTIME
Qty: 90 TABLET | Refills: 3 | Status: SHIPPED | OUTPATIENT
Start: 2022-08-23 | End: 2022-11-09

## 2022-08-23 RX ORDER — SIMVASTATIN 40 MG
40 TABLET ORAL AT BEDTIME
Qty: 90 TABLET | Refills: 3 | Status: SHIPPED | OUTPATIENT
Start: 2022-08-23 | End: 2023-08-24

## 2022-08-23 ASSESSMENT — ANXIETY QUESTIONNAIRES
GAD7 TOTAL SCORE: 9
6. BECOMING EASILY ANNOYED OR IRRITABLE: SEVERAL DAYS
5. BEING SO RESTLESS THAT IT IS HARD TO SIT STILL: SEVERAL DAYS
1. FEELING NERVOUS, ANXIOUS, OR ON EDGE: SEVERAL DAYS
3. WORRYING TOO MUCH ABOUT DIFFERENT THINGS: NEARLY EVERY DAY
2. NOT BEING ABLE TO STOP OR CONTROL WORRYING: MORE THAN HALF THE DAYS
7. FEELING AFRAID AS IF SOMETHING AWFUL MIGHT HAPPEN: NOT AT ALL
GAD7 TOTAL SCORE: 9

## 2022-08-23 ASSESSMENT — PATIENT HEALTH QUESTIONNAIRE - PHQ9: 5. POOR APPETITE OR OVEREATING: SEVERAL DAYS

## 2022-10-24 ENCOUNTER — TRANSFERRED RECORDS (OUTPATIENT)
Dept: FAMILY MEDICINE | Facility: CLINIC | Age: 77
End: 2022-10-24

## 2022-11-09 DIAGNOSIS — G47.9 SLEEP DISORDER: ICD-10-CM

## 2022-11-09 NOTE — TELEPHONE ENCOUNTER
traZODone (DESYREL) 50 MG    LOV 8/23/22 cpx  Last labs 8/9/22      Sleep disorder  helping  -     traZODone (DESYREL) 50 MG tablet; Take 1 tablet (50 mg) by mouth At Bedtime  Return to clinic in one year    No future appt yet

## 2022-11-10 RX ORDER — TRAZODONE HYDROCHLORIDE 50 MG/1
TABLET, FILM COATED ORAL
Qty: 30 TABLET | Refills: 0 | Status: SHIPPED | OUTPATIENT
Start: 2022-11-10 | End: 2023-01-11

## 2022-11-19 ENCOUNTER — HEALTH MAINTENANCE LETTER (OUTPATIENT)
Age: 77
End: 2022-11-19

## 2023-01-11 DIAGNOSIS — G47.9 SLEEP DISORDER: ICD-10-CM

## 2023-01-11 RX ORDER — TRAZODONE HYDROCHLORIDE 50 MG/1
TABLET, FILM COATED ORAL
Qty: 30 TABLET | Refills: 0 | Status: SHIPPED | OUTPATIENT
Start: 2023-01-11 | End: 2023-08-24

## 2023-03-07 DIAGNOSIS — N91.2 ABSENCE OF MENSTRUATION: ICD-10-CM

## 2023-03-07 RX ORDER — RALOXIFENE HYDROCHLORIDE 60 MG/1
TABLET, FILM COATED ORAL
Qty: 90 TABLET | Refills: 3 | Status: SHIPPED | OUTPATIENT
Start: 2023-03-07 | End: 2024-04-03

## 2023-08-22 NOTE — PROGRESS NOTES
"SUBJECTIVE:   Tess Menchaca is a 78 year old female who presents for Preventive Visit.       Patient has been advised of split billing requirements and indicates understanding: Yes  Are you in the first 12 months of your Medicare Part B coverage?  No    Physical Health:  Answers submitted by the patient for this visit:  Annual Preventive Visit (Submitted on 8/23/2023)  Chief Complaint: Annual Exam:  In general, how would you rate your overall physical health?: good  Frequency of exercise:: 1 day/week  Do you usually eat at least 4 servings of fruit and vegetables a day, include whole grains & fiber, and avoid regularly eating high fat or \"junk\" foods? : Yes  Taking medications regularly:: Yes  Medication side effects:: None  Activities of Daily Living: no assistance needed  Home safety: no safety concerns identified  Hearing Impairment:: difficulty following a conversation in a noisy restaurant or crowded room, difficulty understanding soft or whispered speech  In the past 6 months, have you been bothered by leaking of urine?: No  abdominal pain: No  Blood in stool: No  Blood in urine: No  chest pain: No  chills: No  congestion: No  constipation: No  cough: Yes  diarrhea: No  dizziness: No  ear pain: No  eye pain: No  nervous/anxious: Yes  fever: No  frequency: No  genital sores: No  headaches: No  hearing loss: No  heartburn: No  arthralgias: Yes  joint swelling: No  peripheral edema: No  mood changes: No  myalgias: No  nausea: No  dysuria: No  palpitations: No  Skin sensation changes: No  sore throat: No  urgency: No  rash: No  shortness of breath: No  visual disturbance: Yes  weakness: No  pelvic pain: No  vaginal bleeding: No  vaginal discharge: No  tenderness: No  breast mass: No  breast discharge: No  In general, how would you rate your overall mental or emotional health?: good  Additional concerns today:: Yes  Exercise outside of work (Submitted on 8/23/2023)  Chief Complaint: Annual Exam:  Duration of " exercise:: Less than 15 minutes  HEARING FREQUENCY:   Right Ear:     500 Hz :  pass   1000 Hz:  pass   2000 Hz:  pass   4000 Hz:  pass  Left Ear:     500 Hz :  fail   1000 Hz:  fail   2000 Hz:  pass   4000 Hz:  pass  Tresa Galaviz CMA 2023    Mental Health:  In general, how would you rate your overall mental or emotional health? excellent  PHQ-2 Score:      Do you feel safe in your environment? Yes    Have you ever done Advance Care Planning? (For example, a Health Directive, POLST, or a discussion with a medical provider or your loved ones about your wishes): Yes, advance care planning is on file.    Additional concerns to address?  YES    Fall risk:  Fallen 2 or more times in the past year?: No  Any fall with injury in the past year?: No    Cognitive Screenin) Repeat 3 items (Leader, Season, Table)    2) Clock draw: NORMAL  3) 3 item recall: Recalls 3 objects  Results: 3 items recalled: COGNITIVE IMPAIRMENT LESS LIKELY    Mini-CogTM Copyright MARSHA Hallman. Licensed by the author for use in [facility]; reprinted with permission (soob@[email or web address]). All rights reserved.      Do you have sleep apnea, excessive snoring or daytime drowsiness? : no        PROBLEMS TO ADD ON...  Htn  Osteopenia  Trigger finger    Reviewed and updated as needed this visit by clinical staff   Tobacco  Allergies  Meds  Problems             Reviewed and updated as needed this visit by Provider      Problems            Social History     Tobacco Use    Smoking status: Never    Smokeless tobacco: Never   Substance Use Topics    Alcohol use: Yes     Alcohol/week: 5.8 standard drinks of alcohol     Types: 7 Glasses of wine per week     Comment: 8 - 14 drinks per week             2023    10:53 AM   Alcohol Use   Prescreen: >3 drinks/day or >7 drinks/week? No     Do you have a current opioid prescription? No  Do you use any other controlled substances or medications that are not prescribed by a provider? None         "              Current providers sharing in care for this patient include:   Patient Care Team:  Gabriel Robles MD as PCP - General (Family Practice)  Gabriel Robles MD as Assigned PCP    The following health maintenance items are reviewed in Epic and correct as of today:  Health Maintenance   Topic Date Due    PHQ-2 (once per calendar year)  01/01/2023    COVID-19 Vaccine (6 - Pfizer series) 01/14/2023    INFLUENZA VACCINE (1) 09/01/2023    MAMMO SCREENING  10/24/2023    MEDICARE ANNUAL WELLNESS VISIT  08/24/2024    FALL RISK ASSESSMENT  08/24/2024    DTAP/TDAP/TD IMMUNIZATION (3 - Td or Tdap) 04/04/2026    LIPID  08/09/2027    ADVANCE CARE PLANNING  08/24/2028    DEXA  09/21/2037    HEPATITIS C SCREENING  Completed    Pneumococcal Vaccine: 65+ Years  Completed    ZOSTER IMMUNIZATION  Completed    IPV IMMUNIZATION  Aged Out    MENINGITIS IMMUNIZATION  Aged Out    COLORECTAL CANCER SCREENING  Discontinued     Lab work is in process  Mammogram Screening: Mammogram Screening - Patient over age 75, has elected to continue with screening.    ROS:  Constitutional, HEENT, cardiovascular, pulmonary, GI, , musculoskeletal, neuro, skin, endocrine and psych systems are negative, except as otherwise noted.    OBJECTIVE:   BP (!) 140/86   Pulse 102   Ht 1.549 m (5' 1\")   Wt 61.3 kg (135 lb 3.2 oz)   SpO2 96%   BMI 25.55 kg/m   Estimated body mass index is 25.55 kg/m  as calculated from the following:    Height as of this encounter: 1.549 m (5' 1\").    Weight as of this encounter: 61.3 kg (135 lb 3.2 oz).  EXAM:   GENERAL APPEARANCE: healthy, alert and no distress  EYES: Eyes grossly normal to inspection, PERRL and conjunctivae and sclerae normal  HENT: ear canals and TM's normal, nose and mouth without ulcers or lesions, oropharynx clear and oral mucous membranes moist  NECK: no adenopathy, no asymmetry, masses, or scars and thyroid normal to palpation  RESP: lungs clear to auscultation - no rales, rhonchi or " wheezes  BREAST: normal without masses, tenderness or nipple discharge and no palpable axillary masses or adenopathy  CV: regular rate and rhythm, normal S1 S2, no S3 or S4, no murmur, click or rub, no peripheral edema and peripheral pulses strong  ABDOMEN: soft, nontender, no hepatosplenomegaly, no masses and bowel sounds normal  MS: no musculoskeletal defects are noted and gait is age appropriate without ataxia  SKIN: no suspicious lesions or rashes  NEURO: Normal strength and tone, sensory exam grossly normal, mentation intact and speech normal  PSYCH: mentation appears normal and affect normal/bright    Diagnostic Test Results:  Labs reviewed in Epic    ASSESSMENT / PLAN:   Tess was seen today for physical, hearing screening and musculoskeletal problem.    Diagnoses and all orders for this visit:    Encounter for Medicare annual wellness exam    Hypercholesteremia  Hyperlipidemia  Discussed current lipid results, previous results (if available) current guidelines (NCEP) for treatment and goals for lipids.    Discussed ongoing lifestyle modification, dietary changes (low fat, low simple carb) and regular aerobic exercise.    Discussed the link between dysmetabolic syndrome and impaired glucose tolerance seen in certain patterns of lipids.     Reviewed medication use for lipid lowering, including the statins are their possible side effects of myalgias, rhabdomyolysis, and liver toxicity.  We today managed his prescriptions with refills ensured to ensure availabilty of current medications.  Discussed the importance for aggressive management of dyslipidemia to prevent vascular complications later.     Instructed to contact me if she develop any intolerance to the treatment.  \  -     Lipid Profile ([facility])  -     simvastatin (ZOCOR) 40 MG tablet; Take 1 tablet (40 mg) by mouth At Bedtime    Chronic pain of left knee  Continue icing  Prediabetes  Check fasting sugars  -     Comprehensive metabolic panel;  "Future    Essential hypertension with goal blood pressure less than 140/90  -     CBC with Diff/Plt ([facility])  -     Comprehensive metabolic panel; Future    Osteopenia of lumbar spine    Benign essential hypertension  -     lisinopril (ZESTRIL) 10 MG tablet; Take 1 tablet (10 mg) by mouth daily    Sleep disorder  -     traZODone (DESYREL) 50 MG tablet; Take 1 tablet (50 mg) by mouth At Bedtime        Patient has been advised of split billing requirements and indicates understanding: Yes    COUNSELING:  Reviewed preventive health counseling, as reflected in patient instructions       Regular exercise       Healthy diet/nutrition    Estimated body mass index is 25.55 kg/m  as calculated from the following:    Height as of this encounter: 1.549 m (5' 1\").    Weight as of this encounter: 61.3 kg (135 lb 3.2 oz).        She reports that she has never smoked. She has never used smokeless tobacco.    I have reviewed Opioid Use Disorder and Substance Use Disorder risk factors and made any needed referrals.   Appropriate preventive services were discussed with this patient, including applicable screening as appropriate for cardiovascular disease, diabetes, osteopenia/osteoporosis, and glaucoma.  As appropriate for age/gender, discussed screening for colorectal cancer, prostate cancer, breast cancer, and cervical cancer. Checklist reviewing preventive services available has been given to the patient.    Reviewed patients plan of care and provided an AVS. The Basic Care Plan (routine screening as documented in Health Maintenance) for Tess meets the Care Plan requirement. This Care Plan has been established and reviewed with the Patient.    Counseling Resources:  ATP IV Guidelines  Pooled Cohorts Equation Calculator  Breast Cancer Risk Calculator  BRCA-Related Cancer Risk Assessment: FHS-7 Tool  FRAX Risk Assessment  ICSI Preventive Guidelines  Dietary Guidelines for Americans, 2010  USDA's MyPlate  ASA Prophylaxis  Lung CA " Screening    Gabriel Robles MD  [facility]

## 2023-08-22 NOTE — PATIENT INSTRUCTIONS
Patient Education   Personalized Prevention Plan  You are due for the preventive services outlined below.  Your care team is available to assist you in scheduling these services.  If you have already completed any of these items, please share that information with your care team to update in your medical record.  Health Maintenance Due   Topic Date Due    PHQ-2 (once per calendar year)  01/01/2023    COVID-19 Vaccine (6 - Pfizer series) 01/14/2023        Hypertension   What is hypertension?   Hypertension is blood pressure that keeps being higher than normal. Blood pressure is the force of blood against artery walls as the heart pumps blood through the body. Blood pressure can be unhealthy if it is above 120/80. The higher your blood pressure, the greater the health risk.   High blood pressure can be controlled if you take these steps:   Maintain a healthy weight.   Are physically active.   Follow a healthy eating plan, which includes foods that do not have a lot of salt and sodium.   Do not drink a lot of alcohol.   Our goal is to keep the systolic (top) number 128 or lower and the diastolic (bottom) number 83 or lower

## 2023-08-23 ASSESSMENT — ENCOUNTER SYMPTOMS
HEADACHES: 0
MYALGIAS: 0
PARESTHESIAS: 0
NAUSEA: 0
DIZZINESS: 0
SORE THROAT: 0
PALPITATIONS: 0
FREQUENCY: 0
ABDOMINAL PAIN: 0
SHORTNESS OF BREATH: 0
DIARRHEA: 0
COUGH: 1
BREAST MASS: 0
HEMATURIA: 0
HEMATOCHEZIA: 0
EYE PAIN: 0
WEAKNESS: 0
JOINT SWELLING: 0
FEVER: 0
NERVOUS/ANXIOUS: 1
ARTHRALGIAS: 1
CHILLS: 0
DYSURIA: 0
HEARTBURN: 0
CONSTIPATION: 0

## 2023-08-23 ASSESSMENT — ACTIVITIES OF DAILY LIVING (ADL): CURRENT_FUNCTION: NO ASSISTANCE NEEDED

## 2023-08-24 ENCOUNTER — OFFICE VISIT (OUTPATIENT)
Dept: FAMILY MEDICINE | Facility: CLINIC | Age: 78
End: 2023-08-24

## 2023-08-24 VITALS
HEIGHT: 61 IN | BODY MASS INDEX: 25.53 KG/M2 | OXYGEN SATURATION: 96 % | SYSTOLIC BLOOD PRESSURE: 140 MMHG | DIASTOLIC BLOOD PRESSURE: 86 MMHG | WEIGHT: 135.2 LBS | HEART RATE: 102 BPM

## 2023-08-24 DIAGNOSIS — Z00.00 ENCOUNTER FOR MEDICARE ANNUAL WELLNESS EXAM: Primary | ICD-10-CM

## 2023-08-24 DIAGNOSIS — R73.03 PREDIABETES: ICD-10-CM

## 2023-08-24 DIAGNOSIS — G47.9 SLEEP DISORDER: ICD-10-CM

## 2023-08-24 DIAGNOSIS — E78.00 HYPERCHOLESTEREMIA: ICD-10-CM

## 2023-08-24 DIAGNOSIS — M85.88 OSTEOPENIA OF LUMBAR SPINE: ICD-10-CM

## 2023-08-24 DIAGNOSIS — I10 ESSENTIAL HYPERTENSION WITH GOAL BLOOD PRESSURE LESS THAN 140/90: ICD-10-CM

## 2023-08-24 DIAGNOSIS — G89.29 CHRONIC PAIN OF LEFT KNEE: ICD-10-CM

## 2023-08-24 DIAGNOSIS — I10 BENIGN ESSENTIAL HYPERTENSION: ICD-10-CM

## 2023-08-24 DIAGNOSIS — M25.562 CHRONIC PAIN OF LEFT KNEE: ICD-10-CM

## 2023-08-24 LAB
% GRANULOCYTES: 65.5 % (ref 42.2–75.2)
ALBUMIN SERPL BCG-MCNC: 4.4 G/DL (ref 3.5–5.2)
ALP SERPL-CCNC: 65 U/L (ref 35–104)
ALT SERPL W P-5'-P-CCNC: 15 U/L (ref 0–50)
ANION GAP SERPL CALCULATED.3IONS-SCNC: 13 MMOL/L (ref 7–15)
AST SERPL W P-5'-P-CCNC: 21 U/L (ref 0–45)
BILIRUB SERPL-MCNC: 0.4 MG/DL
BUN SERPL-MCNC: 16 MG/DL (ref 8–23)
CALCIUM SERPL-MCNC: 9.1 MG/DL (ref 8.8–10.2)
CHLORIDE SERPL-SCNC: 100 MMOL/L (ref 98–107)
CHOLESTEROL: 191 MG/DL (ref 100–199)
CREAT SERPL-MCNC: 0.72 MG/DL (ref 0.51–0.95)
DEPRECATED HCO3 PLAS-SCNC: 24 MMOL/L (ref 22–29)
FASTING?: YES
GFR SERPL CREATININE-BSD FRML MDRD: 86 ML/MIN/1.73M2
GLUCOSE SERPL-MCNC: 109 MG/DL (ref 70–99)
HBA1C MFR BLD: 6.1 %
HCT VFR BLD AUTO: 43.9 % (ref 35–46)
HDL (RMG): 64 MG/DL (ref 40–?)
HEMOGLOBIN: 14.6 G/DL (ref 11.8–15.5)
LDL CALCULATED (RMG): 114 MG/DL (ref 0–130)
LYMPHOCYTES NFR BLD AUTO: 27.6 % (ref 20.5–51.1)
MCH RBC QN AUTO: 29.7 PG (ref 27–31)
MCHC RBC AUTO-ENTMCNC: 33.3 G/DL (ref 33–37)
MCV RBC AUTO: 89.1 FL (ref 80–100)
MONOCYTES NFR BLD AUTO: 6.9 % (ref 1.7–9.3)
PLATELET # BLD AUTO: 271 K/UL (ref 140–450)
POTASSIUM SERPL-SCNC: 4.3 MMOL/L (ref 3.4–5.3)
PROT SERPL-MCNC: 6.8 G/DL (ref 6.4–8.3)
RBC # BLD AUTO: 4.93 X10/CMM (ref 3.7–5.2)
SODIUM SERPL-SCNC: 137 MMOL/L (ref 136–145)
TRIGLYCERIDES (RMG): 67 MG/DL (ref 0–149)
WBC # BLD AUTO: 4.3 X10/CMM (ref 3.8–11)

## 2023-08-24 PROCEDURE — 83036 HEMOGLOBIN GLYCOSYLATED A1C: CPT | Mod: ORL | Performed by: FAMILY MEDICINE

## 2023-08-24 PROCEDURE — 36415 COLL VENOUS BLD VENIPUNCTURE: CPT | Performed by: FAMILY MEDICINE

## 2023-08-24 PROCEDURE — 80061 LIPID PANEL: CPT | Mod: QW | Performed by: FAMILY MEDICINE

## 2023-08-24 PROCEDURE — 99214 OFFICE O/P EST MOD 30 MIN: CPT | Mod: 25 | Performed by: FAMILY MEDICINE

## 2023-08-24 PROCEDURE — 80053 COMPREHEN METABOLIC PANEL: CPT | Mod: ORL | Performed by: FAMILY MEDICINE

## 2023-08-24 PROCEDURE — G0438 PPPS, INITIAL VISIT: HCPCS | Performed by: FAMILY MEDICINE

## 2023-08-24 PROCEDURE — 85025 COMPLETE CBC W/AUTO DIFF WBC: CPT | Performed by: FAMILY MEDICINE

## 2023-08-24 RX ORDER — LISINOPRIL 10 MG/1
10 TABLET ORAL DAILY
Qty: 90 TABLET | Refills: 3 | Status: SHIPPED | OUTPATIENT
Start: 2023-08-24 | End: 2023-09-28

## 2023-08-24 RX ORDER — TRAZODONE HYDROCHLORIDE 50 MG/1
50 TABLET, FILM COATED ORAL AT BEDTIME
Qty: 30 TABLET | Refills: 4 | Status: SHIPPED | OUTPATIENT
Start: 2023-08-24 | End: 2023-10-02

## 2023-08-24 RX ORDER — SIMVASTATIN 40 MG
40 TABLET ORAL AT BEDTIME
Qty: 90 TABLET | Refills: 3 | Status: SHIPPED | OUTPATIENT
Start: 2023-08-24 | End: 2023-09-28

## 2023-08-24 NOTE — RESULT ENCOUNTER NOTE
Dear Tess,     I am writing to report that your included test results are as expected.    Many labs contain some results that are slightly outside of the normal range, I have reviewed any of these results and they require no changes at this time.    Gabriel Robles MD

## 2023-09-28 DIAGNOSIS — I10 BENIGN ESSENTIAL HYPERTENSION: ICD-10-CM

## 2023-09-28 DIAGNOSIS — E78.00 HYPERCHOLESTEREMIA: ICD-10-CM

## 2023-09-28 RX ORDER — LISINOPRIL 10 MG/1
10 TABLET ORAL DAILY
Qty: 90 TABLET | Refills: 3 | Status: SHIPPED | OUTPATIENT
Start: 2023-09-28 | End: 2024-08-28

## 2023-09-28 RX ORDER — SIMVASTATIN 40 MG
40 TABLET ORAL AT BEDTIME
Qty: 90 TABLET | Refills: 3 | Status: SHIPPED | OUTPATIENT
Start: 2023-09-28 | End: 2024-08-28

## 2023-10-02 DIAGNOSIS — G47.9 SLEEP DISORDER: ICD-10-CM

## 2023-10-02 RX ORDER — TRAZODONE HYDROCHLORIDE 50 MG/1
50 TABLET, FILM COATED ORAL AT BEDTIME
Qty: 90 TABLET | Refills: 1 | Status: SHIPPED | OUTPATIENT
Start: 2023-10-02 | End: 2024-06-20

## 2023-11-03 ENCOUNTER — TRANSFERRED RECORDS (OUTPATIENT)
Dept: FAMILY MEDICINE | Facility: CLINIC | Age: 78
End: 2023-11-03

## 2024-04-03 DIAGNOSIS — N91.2 ABSENCE OF MENSTRUATION: ICD-10-CM

## 2024-04-03 RX ORDER — RALOXIFENE HYDROCHLORIDE 60 MG/1
TABLET, FILM COATED ORAL
Qty: 90 TABLET | Refills: 3 | Status: SHIPPED | OUTPATIENT
Start: 2024-04-03

## 2024-04-03 NOTE — TELEPHONE ENCOUNTER
Med: raloxifene    LOV (related): 8/24/23      Due for F/U around: 6 months for bp check/1 year for CPX (8/24/24)      Next Appt: 4/30/24 (preop ) with Travis

## 2024-04-29 NOTE — PROGRESS NOTES
Preoperative Evaluation  [facility]  [address] [place] [address]  Phone: [phone]  Fax: [phone]  Primary Provider: Yue Robles  Pre-op Performing Provider: YUE ROBLES  Apr 30, 2024       Tess is a 78 year old, presenting for the following:  Pre-Op Exam (Surgery/Procedure: cataract /Surgery Location: [facility] [place]  /Surgeon: Myriam David /Surgery Date: 5/10/24 right eye 5/17/24 left eye )      Surgical Information  Surgery/Procedure: cataract   Surgery Location: [facility]    Surgeon: Myrima David   Surgery Date: 5/10/24 right eye 5/17/24 left eye   Time of Surgery: TBD   Where patient plans to recover: At home with family  Fax number for surgical facility: [phone]     Assessment & Plan     The proposed surgical procedure is considered LOW risk.    Preop general physical exam  Prior to intervention for  Age-related cataract of both eyes, unspecified age-related cataract type      Prediabetes  Checked yearly and has been well controlled with diet    Hypercholesteremia  On medications and tolerating well    Essential hypertension with goal blood pressure less than 140/90  Well controlled.      - No identified additional risk factors other than previously addressed    Antiplatelet or Anticoagulation Medication Instructions   - Patient is on no antiplatelet or anticoagulation medications.    Additional Medication Instructions  Patient is to take all scheduled medications on the day of surgery    Recommendation  APPROVAL GIVEN to proceed with proposed procedure, without further diagnostic evaluation.    Subjective       HPI related to upcoming procedure: Bilateral cataracts and ready for intervention.        4/23/2024     2:56 PM   Preop Questions   1. Have you ever had a heart attack or stroke? No   2. Have you ever had surgery on your heart or blood vessels, such as a stent placement, a coronary artery bypass, or  surgery on an artery in your head, neck, heart, or legs? No   3. Do you have chest pain with activity? No   4. Do you have a history of  heart failure? No   5. Do you currently have a cold, bronchitis or symptoms of other infection? No   6. Do you have a cough, shortness of breath, or wheezing? No   7. Do you or anyone in your family have previous history of blood clots? No   8. Do you or does anyone in your family have a serious bleeding problem such as prolonged bleeding following surgeries or cuts? No   9. Have you ever had problems with anemia or been told to take iron pills? No   10. Have you had any abnormal blood loss such as black, tarry or bloody stools, or abnormal vaginal bleeding? No   11. Have you ever had a blood transfusion? No   12. Are you willing to have a blood transfusion if it is medically needed before, during, or after your surgery? Yes   13. Have you or any of your relatives ever had problems with anesthesia? No   14. Do you have sleep apnea, excessive snoring or daytime drowsiness? No   15. Do you have any artifical heart valves or other implanted medical devices like a pacemaker, defibrillator, or continuous glucose monitor? No   16. Do you have artificial joints? No   17. Are you allergic to latex? No       Health Care Directive  Patient has a Health Care Directive on file      Preoperative Review of         Status of Chronic Conditions:  See problem list for active medical problems.  Problems all longstanding and stable, except as noted/documented.  See ROS for pertinent symptoms related to these conditions.    Patient Active Problem List    Diagnosis Date Noted    Osteopenia of lumbar spine 08/23/2022     Priority: Medium    Essential hypertension with goal blood pressure less than 140/90 07/06/2017     Priority: Medium    Prediabetes 07/06/2017     Priority: Medium    S/P laparoscopy 06/21/2012     Priority: Medium    S/P endometrial ablation 06/21/2012     Priority: Medium     Ovarian cyst 06/21/2012     Priority: Medium    Hypercholesteremia 09/20/2011     Priority: Medium      Past Medical History:   Diagnosis Date    Cancer (H)     basal cell carcinoma face    Depression     Hypercholesteremia 9/20/2011    Hyperlipidemia     Hypertension     Leiomyoma of uterus, unspecified     Lyme disease     Migraines     Osteopenia     Other and unspecified ovarian cyst     Post-menopausal bleeding      Past Surgical History:   Procedure Laterality Date    ARTHROSCOPY KNEE      right    BREAST SURGERY      aspiration right brest - cysts    COLONOSCOPY      COLONOSCOPY N/A 10/23/2018    Procedure: COLONOSCOPY;  Surgeon: Mayco Kirkpatrick MD;  Location:  GI    DILATION AND CURETTAGE, OPERATIVE HYSTEROSCOPY, COMBINED  6/21/2012    Procedure: COMBINED DILATION AND CURETTAGE, OPERATIVE HYSTEROSCOPY;;  Surgeon: Mayco Seth MD;  Location: [facility]    LAPAROSCOPIC SALPINGO-OOPHORECTOMY  6/21/2012    Procedure: LAPAROSCOPIC SALPINGO-OOPHORECTOMY;  PELVISCOPY, BILATERAL SALPINGO OOPHORECTOMY, DIAGNOSTIC HYSTEROSCOPY WITH THERMACHOICE ABLATION;  Surgeon: Mayco Seth MD;  Location: [facility]    MOHS MICROGRAPHIC PROCEDURE      RELEASE CARPAL TUNNEL      bilateral     Current Outpatient Medications   Medication Sig Dispense Refill    lisinopril (ZESTRIL) 10 MG tablet TAKE 1 TABLET(10 MG) BY MOUTH DAILY 90 tablet 3    Multiple Vitamin (DAILY MULTIVITAMIN PO) Take 1 tablet by mouth daily.      naproxen sodium (ALEVE) 220 MG capsule Take 220 mg by mouth 2 times daily (with meals)      ofloxacin (OCUFLOX) 0.3 % ophthalmic solution INSTILL 1 DROP IN LEFT EYE FOUR TIMES DAILY. START AFTER CATARACT SURGERY IN LEFT EYE      prednisoLONE acetate (PRED FORTE) 1 % ophthalmic suspension SHAKE LIQUID AND INSTILL 1 DROP IN LEFT EYE FOUR TIMES DAILY. START AFTER CATARACT SURGERY IN LEFT EYE      raloxifene (EVISTA) 60 MG tablet TAKE 1 TABLET(60 MG) BY MOUTH AT BEDTIME 90 tablet 3    simvastatin (ZOCOR) 40  "MG tablet TAKE 1 TABLET(40 MG) BY MOUTH AT BEDTIME 90 tablet 3    traZODone (DESYREL) 50 MG tablet TAKE 1 TABLET(50 MG) BY MOUTH AT BEDTIME 90 tablet 1    VITAMIN D, CHOLECALCIFEROL, PO Take 5,000 Units by mouth daily         No Known Allergies     Social History     Tobacco Use    Smoking status: Never    Smokeless tobacco: Never   Substance Use Topics    Alcohol use: Yes     Alcohol/week: 5.8 standard drinks of alcohol     Types: 7 Glasses of wine per week     Comment: 8 - 14 drinks per week     Family History   Problem Relation Age of Onset    No Known Problems Mother     Dementia Father         85    Parkinsonism Brother 62     History   Drug Use No         Review of Systems    Review of Systems  Constitutional, HEENT, cardiovascular, pulmonary, GI, , musculoskeletal, neuro, skin, endocrine and psych systems are negative, except as otherwise noted.  Some ongoing joint pain and sciatica    Objective    BP (!) 132/94   Pulse 78   Temp 98.7  F (37.1  C) (Oral)   Ht 1.543 m (5' 0.75\")   Wt 62.6 kg (138 lb)   SpO2 96%   BMI 26.29 kg/m     Estimated body mass index is 26.29 kg/m  as calculated from the following:    Height as of this encounter: 1.543 m (5' 0.75\").    Weight as of this encounter: 62.6 kg (138 lb).  Physical Exam  GENERAL: alert and no distress  EYES: Eyes grossly normal to inspection, PERRL and conjunctivae and sclerae normal  HENT: ear canals and TM's normal, nose and mouth without ulcers or lesions  NECK: no adenopathy, no asymmetry, masses, or scars  RESP: lungs clear to auscultation - no rales, rhonchi or wheezes  CV: regular rate and rhythm, normal S1 S2, no S3 or S4, no murmur, click or rub, no peripheral edema  ABDOMEN: soft, nontender, no hepatosplenomegaly, no masses and bowel sounds normal  MS: no gross musculoskeletal defects noted, no edema  SKIN: no suspicious lesions or rashes  NEURO: Normal strength and tone, mentation intact and speech normal  PSYCH: mentation appears normal, " affect normal/bright    Recent Labs   Lab Test 08/24/23  1041 08/24/23  1030 08/09/22  1019 08/09/22  1015   HGB 14.6  --  14.4  --      --  218  --    NA  --  137  --  141   POTASSIUM  --  4.3  --  4.0   CR  --  0.72  --  0.75   A1C  --  6.1*  --   --         Diagnostics  No labs were ordered during this visit.   No EKG required for low risk surgery (cataract, skin procedure, breast biopsy, etc).    Revised Cardiac Risk Index (RCRI)  The patient has the following serious cardiovascular risks for perioperative complications:   - No serious cardiac risks = 0 points     RCRI Interpretation: 0 points: Class I (very low risk - 0.4% complication rate)         Signed Electronically by: Gabriel Robles MD  Copy of this evaluation report is provided to requesting physician.

## 2024-04-29 NOTE — PATIENT INSTRUCTIONS
The book the 36 hour day can be very helpful.    Preparing for Your Surgery  Getting started  A nurse will call you to review your health history and instructions. They will give you an arrival time based on your scheduled surgery time. Please be ready to share:  Your doctor's clinic name and phone number  Your medical, surgical, and anesthesia history  A list of allergies and sensitivities  A list of medicines, including herbal treatments and over-the-counter drugs  Whether the patient has a legal guardian (ask how to send us the papers in advance)  Please tell us if you're pregnant--or if there's any chance you might be pregnant. Some surgeries may injure a fetus (unborn baby), so they require a pregnancy test. Surgeries that are safe for a fetus don't always need a test, and you can choose whether to have one.   If you have a child who's having surgery, please ask for a copy of Preparing for Your Child's Surgery.    Preparing for surgery  Within 10 to 30 days of surgery: Have a pre-op exam (sometimes called an H&P, or History and Physical). This can be done at a clinic or pre-operative center.  If you're having a , you may not need this exam. Talk to your care team.  At your pre-op exam, talk to your care team about all medicines you take. If you need to stop any medicines before surgery, ask when to start taking them again.  We do this for your safety. Many medicines can make you bleed too much during surgery. Some change how well surgery (anesthesia) drugs work.  Call your insurance company to let them know you're having surgery. (If you don't have insurance, call [phone].)  Call your clinic if there's any change in your health. This includes signs of a cold or flu (sore throat, runny nose, cough, rash, fever). It also includes a scrape or scratch near the surgery site.  If you have questions on the day of surgery, call your hospital or surgery center.  Eating and drinking guidelines  For your  safety: Unless your surgeon tells you otherwise, follow the guidelines below.  Eat and drink as usual until 8 hours before you arrive for surgery. After that, no food or milk.  Drink clear liquids until 2 hours before you arrive. These are liquids you can see through, like water, Gatorade, and Propel Water. They also include plain black coffee and tea (no cream or milk), candy, and breath mints. You can spit out gum when you arrive.  If you drink alcohol: Stop drinking it the night before surgery.  If your care team tells you to take medicine on the morning of surgery, it's okay to take it with a sip of water.  Preventing infection  Shower or bathe the night before and morning of your surgery. Follow the instructions your clinic gave you. (If no instructions, use regular soap.)  Don't shave or clip hair near your surgery site. We'll remove the hair if needed.  Don't smoke or vape the morning of surgery. You may chew nicotine gum up to 2 hours before surgery. A nicotine patch is okay.  Note: Some surgeries require you to completely quit smoking and nicotine. Check with your surgeon.  Your care team will make every effort to keep you safe from infection. We will:  Clean our hands often with soap and water (or an alcohol-based hand rub).  Clean the skin at your surgery site with a special soap that kills germs.  Give you a special gown to keep you warm. (Cold raises the risk of infection.)  Wear special hair covers, masks, gowns and gloves during surgery.  Give antibiotic medicine, if prescribed. Not all surgeries need antibiotics.  What to bring on the day of surgery  Photo ID and insurance card  Copy of your health care directive, if you have one  Glasses and hearing aids (bring cases)  You can't wear contacts during surgery  Inhaler and eye drops, if you use them (tell us about these when you arrive)  CPAP machine or breathing device, if you use them  A few personal items, if spending the night  If you have . . .  A  pacemaker, ICD (cardiac defibrillator) or other implant: Bring the ID card.  An implanted stimulator: Bring the remote control.  A legal guardian: Bring a copy of the certified (court-stamped) guardianship papers.  Please remove any jewelry, including body piercings. Leave jewelry and other valuables at home.  If you're going home the day of surgery  You must have a responsible adult drive you home. They should stay with you overnight as well.  If you don't have someone to stay with you, and you aren't safe to go home alone, we may keep you overnight. Insurance often won't pay for this.  After surgery  If it's hard to control your pain or you need more pain medicine, please call your surgeon's office.  Questions?   If you have any questions for your care team, list them here: _________________________________________________________________________________________________________________________________________________________________________ ____________________________________ ____________________________________ ____________________________________  For informational purposes only. Not to replace the advice of your health care provider. Copyright   2003, 2019 [employer]. All rights reserved. Clinically reviewed by Maty Sinclair MD. [employer] 059657 - REV 12/22.    How to Take Your Medication Before Surgery  - Take all of your medications before surgery as usual

## 2024-04-30 ENCOUNTER — OFFICE VISIT (OUTPATIENT)
Dept: FAMILY MEDICINE | Facility: CLINIC | Age: 79
End: 2024-04-30

## 2024-04-30 VITALS
HEIGHT: 61 IN | BODY MASS INDEX: 26.06 KG/M2 | TEMPERATURE: 98.7 F | WEIGHT: 138 LBS | HEART RATE: 78 BPM | OXYGEN SATURATION: 96 % | SYSTOLIC BLOOD PRESSURE: 132 MMHG | DIASTOLIC BLOOD PRESSURE: 94 MMHG

## 2024-04-30 DIAGNOSIS — I10 ESSENTIAL HYPERTENSION WITH GOAL BLOOD PRESSURE LESS THAN 140/90: ICD-10-CM

## 2024-04-30 DIAGNOSIS — H25.9 AGE-RELATED CATARACT OF BOTH EYES, UNSPECIFIED AGE-RELATED CATARACT TYPE: ICD-10-CM

## 2024-04-30 DIAGNOSIS — R73.03 PREDIABETES: ICD-10-CM

## 2024-04-30 DIAGNOSIS — Z01.818 PREOP GENERAL PHYSICAL EXAM: Primary | ICD-10-CM

## 2024-04-30 DIAGNOSIS — E78.00 HYPERCHOLESTEREMIA: ICD-10-CM

## 2024-04-30 PROCEDURE — 99214 OFFICE O/P EST MOD 30 MIN: CPT | Performed by: FAMILY MEDICINE

## 2024-04-30 RX ORDER — OFLOXACIN 3 MG/ML
SOLUTION/ DROPS OPHTHALMIC
COMMUNITY
Start: 2024-02-12 | End: 2024-08-28

## 2024-04-30 RX ORDER — PREDNISOLONE ACETATE 10 MG/ML
SUSPENSION/ DROPS OPHTHALMIC
COMMUNITY
Start: 2024-02-12 | End: 2024-08-28

## 2024-04-30 RX ORDER — COVID-19 ANTIGEN TEST
220 KIT MISCELLANEOUS 2 TIMES DAILY WITH MEALS
COMMUNITY
Start: 2024-04-21

## 2024-05-01 NOTE — PROGRESS NOTES
4/30/24 faxed preop to [facility] @ [phone]    Davi Pizano,   [facility]  [phone]

## 2024-05-31 NOTE — PROGRESS NOTES
Answers submitted by the patient for this visit:  General Questionnaire (Submitted on 5/31/2024)  Chief Complaint: Chronic problems general questions HPI Form  How many servings of fruits and vegetables do you eat daily?: 2-3  On average, how many sweetened beverages do you drink each day (Examples: soda, juice, sweet tea, etc.  Do NOT count diet or artificially sweetened beverages)?: 1  How many minutes a day do you exercise enough to make your heart beat faster?: 9 or less  How many days a week do you exercise enough to make your heart beat faster?: 3 or less  How many days per week do you miss taking your medication?: 0  General Concern (Submitted on 5/31/2024)  Chief Complaint: Chronic problems general questions HPI Form  What is the reason for your visit today?: Check mole on forehead  When did your symptoms begin?: More than a month  What are your symptoms?: Spot on skin  How would you describe these symptoms?: Mild  Are your symptoms:: Improving  Have you had these symptoms before?: Yes  Have you tried or received treatment for these symptoms before?: No  Is there anything that makes you feel worse?: N/a  Is there anything that makes you feel better?: N/a    Tess Menchaca is a 78 year old patient here for cryotherapy of the face.    Assessment/Plan:  Tess was seen today for derm problem.    Diagnoses and all orders for this visit:    Inflamed seborrheic keratosis    Frozen times two  Gabriel Robles MD   [facility]  [phone]

## 2024-06-03 ENCOUNTER — OFFICE VISIT (OUTPATIENT)
Dept: FAMILY MEDICINE | Facility: CLINIC | Age: 79
End: 2024-06-03

## 2024-06-03 VITALS
SYSTOLIC BLOOD PRESSURE: 140 MMHG | HEART RATE: 81 BPM | WEIGHT: 137.2 LBS | OXYGEN SATURATION: 97 % | DIASTOLIC BLOOD PRESSURE: 91 MMHG | BODY MASS INDEX: 26.14 KG/M2

## 2024-06-03 DIAGNOSIS — L82.0 INFLAMED SEBORRHEIC KERATOSIS: Primary | ICD-10-CM

## 2024-06-03 PROCEDURE — 99213 OFFICE O/P EST LOW 20 MIN: CPT | Performed by: FAMILY MEDICINE

## 2024-06-03 PROCEDURE — G2211 COMPLEX E/M VISIT ADD ON: HCPCS | Performed by: FAMILY MEDICINE

## 2024-06-20 DIAGNOSIS — G47.9 SLEEP DISORDER: ICD-10-CM

## 2024-06-20 NOTE — CONFIDENTIAL NOTE
Med: TRAZODONE    LOV (related): 8/24/23 - CPX      Due for F/U around: 8/2024 FOR CPX    Next Appt: 8/28/24

## 2024-06-24 RX ORDER — TRAZODONE HYDROCHLORIDE 50 MG/1
50 TABLET, FILM COATED ORAL AT BEDTIME
Qty: 90 TABLET | Refills: 1 | Status: SHIPPED | OUTPATIENT
Start: 2024-06-24 | End: 2024-08-28

## 2024-07-24 ENCOUNTER — APPOINTMENT (OUTPATIENT)
Dept: URBAN - METROPOLITAN AREA CLINIC 255 | Age: 79
Setting detail: DERMATOLOGY
End: 2024-07-24

## 2024-07-24 DIAGNOSIS — L81.0 POSTINFLAMMATORY HYPERPIGMENTATION: ICD-10-CM

## 2024-07-24 DIAGNOSIS — D49.2 NEOPLASM OF UNSPECIFIED BEHAVIOR OF BONE, SOFT TISSUE, AND SKIN: ICD-10-CM

## 2024-07-24 PROCEDURE — OTHER MIPS QUALITY: OTHER

## 2024-07-24 PROCEDURE — 99202 OFFICE O/P NEW SF 15 MIN: CPT | Mod: 25

## 2024-07-24 PROCEDURE — 11102 TANGNTL BX SKIN SINGLE LES: CPT

## 2024-07-24 PROCEDURE — OTHER BIOPSY BY SHAVE METHOD: OTHER

## 2024-07-24 PROCEDURE — OTHER ADDITIONAL NOTES: OTHER

## 2024-07-24 PROCEDURE — OTHER COUNSELING: OTHER

## 2024-07-24 ASSESSMENT — LOCATION ZONE DERM
LOCATION ZONE: NOSE
LOCATION ZONE: TRUNK

## 2024-07-24 ASSESSMENT — LOCATION DETAILED DESCRIPTION DERM
LOCATION DETAILED: LEFT MEDIAL BREAST 9-10:00 REGION
LOCATION DETAILED: NASAL SUPRATIP

## 2024-07-24 ASSESSMENT — LOCATION SIMPLE DESCRIPTION DERM
LOCATION SIMPLE: NOSE
LOCATION SIMPLE: LEFT BREAST

## 2024-07-24 NOTE — PROCEDURE: ADDITIONAL NOTES
Additional Notes: - Biopsy proven verrucous keratosis in 2019. Reassured of benign nature. Recommended monitoring for changes.
Detail Level: Simple
Render Risk Assessment In Note?: no

## 2024-07-24 NOTE — PROCEDURE: MIPS QUALITY
Quality 137: Melanoma: Continuity Of Care - Recall System: Recall system not utilized, reason not otherwise specified
Detail Level: Detailed
Quality 226: Preventive Care And Screening: Tobacco Use: Screening And Cessation Intervention: Patient screened for tobacco use and is an ex/non-smoker
Quality 47: Advance Care Plan: Advance Care Planning discussed and documented; advance care plan or surrogate decision maker documented in the medical record.

## 2024-08-19 ENCOUNTER — APPOINTMENT (OUTPATIENT)
Dept: URBAN - METROPOLITAN AREA CLINIC 255 | Age: 79
Setting detail: DERMATOLOGY
End: 2024-08-19

## 2024-08-19 VITALS — WEIGHT: 135 LBS | HEIGHT: 61 IN

## 2024-08-19 DIAGNOSIS — L57.0 ACTINIC KERATOSIS: ICD-10-CM

## 2024-08-19 PROCEDURE — OTHER LIQUID NITROGEN: OTHER

## 2024-08-19 PROCEDURE — OTHER MIPS QUALITY: OTHER

## 2024-08-19 PROCEDURE — 17000 DESTRUCT PREMALG LESION: CPT

## 2024-08-19 PROCEDURE — OTHER COUNSELING: OTHER

## 2024-08-19 PROCEDURE — OTHER ADDITIONAL NOTES: OTHER

## 2024-08-19 ASSESSMENT — LOCATION SIMPLE DESCRIPTION DERM: LOCATION SIMPLE: NOSE

## 2024-08-19 ASSESSMENT — LOCATION DETAILED DESCRIPTION DERM: LOCATION DETAILED: NASAL SUPRATIP

## 2024-08-19 ASSESSMENT — LOCATION ZONE DERM: LOCATION ZONE: NOSE

## 2024-08-19 NOTE — PROCEDURE: LIQUID NITROGEN
Number Of Freeze-Thaw Cycles: 2 freeze-thaw cycles
Render Post-Care Instructions In Note?: yes
Duration Of Freeze Thaw-Cycle (Seconds): 2
Post-Care Instructions: I reviewed with the patient in detail post-care instructions. Patient is to wear sunprotection, and avoid picking at any of the treated lesions. Pt may apply Vaseline to crusted or scabbing areas.
Application Tool (Optional): Liquid Nitrogen Sprayer
Render Note In Bullet Format When Appropriate: No
Detail Level: Detailed
Consent: The patient's consent was obtained including but not limited to risks of crusting, scabbing, blistering, scarring, darker or lighter pigmentary change, recurrence, incomplete removal and infection.

## 2024-08-19 NOTE — PROCEDURE: ADDITIONAL NOTES
Additional Notes: -Discussed recommendation of annual FBSE.
Detail Level: Simple
Render Risk Assessment In Note?: no

## 2024-08-26 NOTE — PATIENT INSTRUCTIONS
Patient Education   Preventive Care Advice   This is general advice given by our system to help you stay healthy. However, your care team may have specific advice just for you. Please talk to your care team about your preventive care needs.  Nutrition  Eat 5 or more servings of fruits and vegetables each day.  Try wheat bread, brown rice and whole grain pasta (instead of white bread, rice, and pasta).  Get enough calcium and vitamin D. Check the label on foods and aim for 100% of the RDA (recommended daily allowance).  Lifestyle  Exercise at least 150 minutes each week  (30 minutes a day, 5 days a week).  Do muscle strengthening activities 2 days a week. These help control your weight and prevent disease.  No smoking.  Wear sunscreen to prevent skin cancer.  Have a dental exam and cleaning every 6 months.  Yearly exams  See your health care team every year to talk about:  Any changes in your health.  Any medicines your care team has prescribed.  Preventive care, family planning, and ways to prevent chronic diseases.  Shots (vaccines)   HPV shots (up to age 26), if you've never had them before.  Hepatitis B shots (up to age 59), if you've never had them before.  COVID-19 shot: Get this shot when it's due.  Flu shot: Get a flu shot every year.  Tetanus shot: Get a tetanus shot every 10 years.  Pneumococcal, hepatitis A, and RSV shots: Ask your care team if you need these based on your risk.  Shingles shot (for age 50 and up)  General health tests  Diabetes screening:  Starting at age 35, Get screened for diabetes at least every 3 years.  If you are younger than age 35, ask your care team if you should be screened for diabetes.  Cholesterol test: At age 39, start having a cholesterol test every 5 years, or more often if advised.  Bone density scan (DEXA): At age 50, ask your care team if you should have this scan for osteoporosis (brittle bones).  Hepatitis C: Get tested at least once in your life.  STIs (sexually  transmitted infections)  Before age 24: Ask your care team if you should be screened for STIs.  After age 24: Get screened for STIs if you're at risk. You are at risk for STIs (including HIV) if:  You are sexually active with more than one person.  You don't use condoms every time.  You or a partner was diagnosed with a sexually transmitted infection.  If you are at risk for HIV, ask about PrEP medicine to prevent HIV.  Get tested for HIV at least once in your life, whether you are at risk for HIV or not.  Cancer screening tests  Cervical cancer screening: If you have a cervix, begin getting regular cervical cancer screening tests starting at age 21.  Breast cancer scan (mammogram): If you've ever had breasts, begin having regular mammograms starting at age 40. This is a scan to check for breast cancer.  Colon cancer screening: It is important to start screening for colon cancer at age 45.  Have a colonoscopy test every 10 years (or more often if you're at risk) Or, ask your provider about stool tests like a FIT test every year or Cologuard test every 3 years.  To learn more about your testing options, visit:   .  For help making a decision, visit:   https://bit.ly/qe43923.  Prostate cancer screening test: If you have a prostate, ask your care team if a prostate cancer screening test (PSA) at age 55 is right for you.  Lung cancer screening: If you are a current or former smoker ages 50 to 80, ask your care team if ongoing lung cancer screenings are right for you.  For informational purposes only. Not to replace the advice of your health care provider. Copyright   2023 [employer]. All rights reserved. Clinically reviewed by the [facility] Transitions Program. [employer] 689933 - REV 01/24.

## 2024-08-27 SDOH — HEALTH STABILITY: PHYSICAL HEALTH: ON AVERAGE, HOW MANY MINUTES DO YOU ENGAGE IN EXERCISE AT THIS LEVEL?: 20 MIN

## 2024-08-27 SDOH — HEALTH STABILITY: PHYSICAL HEALTH: ON AVERAGE, HOW MANY DAYS PER WEEK DO YOU ENGAGE IN MODERATE TO STRENUOUS EXERCISE (LIKE A BRISK WALK)?: 2 DAYS

## 2024-08-27 ASSESSMENT — SOCIAL DETERMINANTS OF HEALTH (SDOH): HOW OFTEN DO YOU GET TOGETHER WITH FRIENDS OR RELATIVES?: THREE TIMES A WEEK

## 2024-08-28 ENCOUNTER — OFFICE VISIT (OUTPATIENT)
Dept: FAMILY MEDICINE | Facility: CLINIC | Age: 79
End: 2024-08-28

## 2024-08-28 VITALS
SYSTOLIC BLOOD PRESSURE: 140 MMHG | OXYGEN SATURATION: 98 % | DIASTOLIC BLOOD PRESSURE: 72 MMHG | WEIGHT: 136 LBS | BODY MASS INDEX: 25.91 KG/M2 | HEART RATE: 78 BPM

## 2024-08-28 DIAGNOSIS — E78.00 HYPERCHOLESTEREMIA: ICD-10-CM

## 2024-08-28 DIAGNOSIS — Z00.00 ENCOUNTER FOR MEDICARE ANNUAL WELLNESS EXAM: Primary | ICD-10-CM

## 2024-08-28 DIAGNOSIS — I10 BENIGN ESSENTIAL HYPERTENSION: ICD-10-CM

## 2024-08-28 DIAGNOSIS — G47.9 SLEEP DISORDER: ICD-10-CM

## 2024-08-28 DIAGNOSIS — F33.42 MAJOR DEPRESSIVE DISORDER, RECURRENT EPISODE, IN FULL REMISSION (H): ICD-10-CM

## 2024-08-28 DIAGNOSIS — R73.03 PREDIABETES: ICD-10-CM

## 2024-08-28 LAB
% GRANULOCYTES: 71.3 % (ref 42.2–75.2)
ALBUMIN SERPL BCG-MCNC: 4.3 G/DL (ref 3.5–5.2)
ALP SERPL-CCNC: 59 U/L (ref 40–150)
ALT SERPL W P-5'-P-CCNC: 13 U/L (ref 0–50)
ANION GAP SERPL CALCULATED.3IONS-SCNC: 12 MMOL/L (ref 7–15)
AST SERPL W P-5'-P-CCNC: 17 U/L (ref 0–45)
BILIRUB SERPL-MCNC: 0.4 MG/DL
BUN SERPL-MCNC: 19.1 MG/DL (ref 8–23)
CALCIUM SERPL-MCNC: 9.3 MG/DL (ref 8.8–10.4)
CHLORIDE SERPL-SCNC: 104 MMOL/L (ref 98–107)
CHOLESTEROL: 194 MG/DL (ref 100–199)
CREAT SERPL-MCNC: 0.83 MG/DL (ref 0.51–0.95)
EGFRCR SERPLBLD CKD-EPI 2021: 72 ML/MIN/1.73M2
FASTING STATUS PATIENT QL REPORTED: YES
FASTING?: YES
GLUCOSE SERPL-MCNC: 101 MG/DL (ref 70–99)
HBA1C MFR BLD: 6 %
HCO3 SERPL-SCNC: 24 MMOL/L (ref 22–29)
HCT VFR BLD AUTO: 41.7 % (ref 35–46)
HDL (RMG): 76 MG/DL (ref 40–?)
HEMOGLOBIN: 14.1 G/DL (ref 11.8–15.5)
LDL CALCULATED (RMG): 94 MG/DL (ref 0–130)
LYMPHOCYTES NFR BLD AUTO: 21.8 % (ref 20.5–51.1)
MCH RBC QN AUTO: 30 PG (ref 27–31)
MCHC RBC AUTO-ENTMCNC: 33.9 G/DL (ref 33–37)
MCV RBC AUTO: 88.4 FL (ref 80–100)
MONOCYTES NFR BLD AUTO: 6.9 % (ref 1.7–9.3)
PLATELET # BLD AUTO: 204 K/UL (ref 140–450)
POTASSIUM SERPL-SCNC: 4.1 MMOL/L (ref 3.4–5.3)
PROT SERPL-MCNC: 6.8 G/DL (ref 6.4–8.3)
RBC # BLD AUTO: 4.72 X10/CMM (ref 3.7–5.2)
SODIUM SERPL-SCNC: 140 MMOL/L (ref 135–145)
TRIGLYCERIDES (RMG): 115 MG/DL (ref 0–149)
WBC # BLD AUTO: 5.5 X10/CMM (ref 3.8–11)

## 2024-08-28 PROCEDURE — 80061 LIPID PANEL: CPT | Mod: QW | Performed by: FAMILY MEDICINE

## 2024-08-28 PROCEDURE — 36415 COLL VENOUS BLD VENIPUNCTURE: CPT | Performed by: FAMILY MEDICINE

## 2024-08-28 PROCEDURE — G0439 PPPS, SUBSEQ VISIT: HCPCS | Performed by: FAMILY MEDICINE

## 2024-08-28 PROCEDURE — 82247 BILIRUBIN TOTAL: CPT | Mod: ORL | Performed by: FAMILY MEDICINE

## 2024-08-28 PROCEDURE — 83036 HEMOGLOBIN GLYCOSYLATED A1C: CPT | Mod: ORL | Performed by: FAMILY MEDICINE

## 2024-08-28 PROCEDURE — 85025 COMPLETE CBC W/AUTO DIFF WBC: CPT | Performed by: FAMILY MEDICINE

## 2024-08-28 PROCEDURE — 99214 OFFICE O/P EST MOD 30 MIN: CPT | Mod: 25 | Performed by: FAMILY MEDICINE

## 2024-08-28 RX ORDER — TRAZODONE HYDROCHLORIDE 50 MG/1
50 TABLET, FILM COATED ORAL AT BEDTIME
Qty: 90 TABLET | Refills: 1 | Status: SHIPPED | OUTPATIENT
Start: 2024-08-28

## 2024-08-28 RX ORDER — LISINOPRIL 10 MG/1
10 TABLET ORAL DAILY
Qty: 90 TABLET | Refills: 3 | Status: SHIPPED | OUTPATIENT
Start: 2024-08-28

## 2024-08-28 RX ORDER — SIMVASTATIN 40 MG
40 TABLET ORAL AT BEDTIME
Qty: 90 TABLET | Refills: 3 | Status: SHIPPED | OUTPATIENT
Start: 2024-08-28

## 2024-08-28 RX ORDER — OMEGA-3 FATTY ACIDS/FISH OIL 300-1000MG
200 CAPSULE ORAL EVERY 4 HOURS PRN
COMMUNITY
Start: 2024-08-20

## 2024-08-28 NOTE — PROGRESS NOTES
Preventive Care Visit  [facility]  Gabriel Robles MD, Family Medicine  Aug 28, 2024    Assessment & Plan     Encounter for Medicare annual wellness exam    - VENOUS COLLECTION    Benign essential hypertension  Reviewed her current HTN management. Discussed our goal for her is a systolic pressure at or below 128 and diastolic pressure at or below 83.  We today managed her prescriptions with refills ensured to ensure availabilty of current medications.  Discussed the importance for aggressive management of HTN to prevent vascular complications later.  Recommended lower fat, lower carbohydrate, and lower sodium (<2000 mg)diet. Required intervals for follow up on HTN, lab studies reviewed.    Strongly recommened she follow her blood pressures outside the clinic to ensure that BPs are remaining within guidelines,.  Instructed to contact me if the readings are not within guidelines on a regular basis so we can adjust treatment as needed.    - CBC with Diff/Plt ([facility])  - Comprehensive metabolic panel  - lisinopril (ZESTRIL) 10 MG tablet  Dispense: 90 tablet; Refill: 3    Hyperlipidemia  Discussed current lipid results, previous results (if available) current guidelines (NCEP) for treatment and goals for lipids.    Discussed ongoing lifestyle modification, dietary changes (low fat, low simple carb) and regular aerobic exercise.    Discussed the link between dysmetabolic syndrome and impaired glucose tolerance seen in certain patterns of lipids.     Reviewed medication use for lipid lowering, including the statins are their possible side effects of myalgias, rhabdomyolysis, and liver toxicity.  We today managed his prescriptions with refills ensured to ensure availabilty of current medications.  Discussed the importance for aggressive management of dyslipidemia to prevent vascular complications later.     Instructed to contact me if she develop any intolerance to the treatment.    - Lipid Profile ([facility])  -  simvastatin (ZOCOR) 40 MG tablet  Dispense: 90 tablet; Refill: 3    Sleep disorder  We discussed sleep hygiene, stimulus control and sleep restriction. He was advised to avoid caffeine within 6 hours of bed, avoid alcohol at night, avoid late meals and late exercise, avoid late physical activity. He was advised to keep his sleep environment cool, dark and quiet. He was advised to try to keep a regular sleep-wake schedule, which he does for the most part, and avoid naps, which he does. For stimulus control, he was advised to avoid being in bed for more than 20 minutes if unable to sleep. If he does get out of bed, he should keep the lights dim, read a book that is not particularly entertaining, and return to sleep if he starts feeling drowsy.    - traZODone (DESYREL) 50 MG tablet  Dispense: 90 tablet; Refill: 1    Previous depression that paxil made worse now on trazadone that helps sleep and that is keeping in a good place.  Her depression in remission and she has no current symptoms.  Spoke about mood disorders including suspected pathophysiology, role of neurotransmitters, and treatment options including medication options ( especially SSRIs that treat cause of sx) and counselling.  she is no longer on SSRIs and we continue to monitor for recurrence of his symptoms.      Prediabetes  Discussed impaired glucose tolerance, and its part in the dysmetabolic syndrome.  Discussed the progression of impaired glucose tolerance toward diabetes mellitus and the need for agressive interventions now to delay and prevent this inevitable progression.  Discussed the overall risks that dysmetabolic syndromes/impaired glucose tolerance/syndrome X pose toward increased risks of vascular disease as the main reason for agressive intervention now.  Will add medications for glucose control (i.e. metformin, glitazones, etc), lipid (e.g. statins), and for blood pressure (preferably ARBs and ACE) as indicated.  Will start these as early  "as needed based other proven ability to delay and modify these risk factors.    - Hemoglobin A1c      Patient has been advised of split billing requirements and indicates understanding: Yes        BMI  Estimated body mass index is 25.91 kg/m  as calculated from the following:    Height as of 4/30/24: 1.543 m (5' 0.75\").    Weight as of this encounter: 61.7 kg (136 lb).       Counseling  Appropriate preventive services were addressed with this patient via screening, questionnaire, or discussion as appropriate for fall prevention, nutrition, physical activity, Tobacco-use cessation, social engagement, weight loss and cognition.  Checklist reviewing preventive services available has been given to the patient.  Reviewed patient's diet, addressing concerns and/or questions.   She is at risk for lack of exercise and has been provided with information to increase physical activity for the benefit of her well-being.   The patient reports drinking more than 3 alcoholic drinks per day and/or more than 7 drhnks per week. The patient was counseled and given information about possible harmful effects of excessive alcohol intake.Patient reported safety concerns were addressed today.The patient was provided with written information regarding signs of hearing loss.       Work on weight loss    No follow-ups on file.    Subjective   Tess is a 79 year old, presenting for the following:  Physical (Fasting/), Hearing Screening, Leg Problem (Bilateral pain but mainly Left leg pain, sciatic nerve ), and Biopsy (Pt wants this noted that she  Had a biopsy on her nose about a week ago 8/19/24)          Health Care Directive  Patient has a Health Care Directive on file  Advance care planning document is on file and is current.    HPI  Here for AWV and the above        8/27/2024   General Health   How would you rate your overall physical health? Good   Feel stress (tense, anxious, or unable to sleep) To some extent      (!) STRESS CONCERN     "  8/27/2024   Nutrition   Diet: Regular (no restrictions)            8/27/2024   Exercise   Days per week of moderate/strenous exercise 2 days   Average minutes spent exercising at this level 20 min      (!) EXERCISE CONCERN      8/27/2024   Social Factors   Frequency of gathering with friends or relatives Three times a week   Worry food won't last until get money to buy more No   Food not last or not have enough money for food? No   Do you have housing? (Housing is defined as stable permanent housing and does not include staying ouside in a car, in a tent, in an abandoned building, in an overnight shelter, or couch-surfing.) Yes   Are you worried about losing your housing? No   Lack of transportation? No   Unable to get utilities (heat,electricity)? No            8/27/2024   Fall Risk   Fallen 2 or more times in the past year? Yes   Trouble with walking or balance? No              8/27/2024   Activities of Daily Living- Home Safety   Needs help with the following daily activites None of the above   Safety concerns in the home No grab bars in the bathroom            8/27/2024   Dental   Dentist two times every year? Yes            8/27/2024   Hearing Screening   Hearing concerns? (!) IT'S HARD TO FOLLOW A CONVERSATION IN A NOISY RESTAURANT OR CROWDED ROOM.      8/28/2024  10:02 AM   Hearing Screen Results    Right Ear - 500Hz/25dB Pass    Right Ear - 1000Hz/20dB Pass    Right Ear - 2000Hz/20dB Pass    Right Ear - 4000Hz/20dB Pass    Left Ear - 500Hz/25dB Pass    Left Ear - 1000Hz/20dB Pass    Left Ear - 2000Hz/20dB Pass    Left Ear - 4000Hz/20dB Pass    Hearing Screen Results Pass               8/27/2024   Driving Risk Screening   Patient/family members have concerns about driving No            8/27/2024   General Alertness/Fatigue Screening   Have you been more tired than usual lately? No            8/27/2024   Urinary Incontinence Screening   Bothered by leaking urine in past 6 months No            8/27/2024   TB  Screening   Were you born outside of the [place]? No              Today's PHQ-2 Score:       8/28/2024    10:02 AM   PHQ-2 ( 1999 [employer])   Q1: Little interest or pleasure in doing things 0   Q2: Feeling down, depressed or hopeless 2   PHQ-2 Score 2         8/27/2024   Substance Use   Alcohol more than 3/day or more than 7/wk Yes   How often do you have a drink containing alcohol 4 or more times a week   How many alcohol drinks on typical day 1 or 2   How often do you have 5+ drinks at one occasion Never   Audit 2/3 Score 0   How often not able to stop drinking once started Never   How often failed to do what normally expected Never   How often needed first drink in am after a heavy drinking session Never   How often feeling of guilt or remorse after drinking Never   How often unable to remember what happened the night before Never   Have you or someone else been injured because of your drinking No   Has anyone been concerned or suggested you cut down on drinking No   TOTAL SCORE - AUDIT 4   Do you have a current opioid prescription? No   How severe/bad is pain from 1 to 10? 2/10   Do you use any other substances recreationally? No        Social History     Tobacco Use    Smoking status: Never    Smokeless tobacco: Never   Substance Use Topics    Alcohol use: Yes     Alcohol/week: 5.8 standard drinks of alcohol     Types: 7 Glasses of wine per week     Comment: 8 - 14 drinks per week    Drug use: No          Mammogram Screening - After age 74- determine frequency with patient based on health status, life expectancy and patient goals    ASCVD Risk   The 10-year ASCVD risk score (Suzy JENKINS, et al., 2019) is: 33.5%    Values used to calculate the score:      Age: 79 years      Sex: Female      Is Non- : No      Diabetic: No      Tobacco smoker: No      Systolic Blood Pressure: 140 mmHg      Is BP treated: Yes      HDL Cholesterol: 64 mg/dL      Total Cholesterol: 191  "mg/dL            Reviewed and updated as needed this visit by Provider                    Lab work is in process  Current providers sharing in care for this patient include:  Patient Care Team:  Gabriel Robles MD as PCP - General (Family Practice)  Gabriel Robles MD as Assigned PCP    The following health maintenance items are reviewed in Epic and correct as of today:  Health Maintenance   Topic Date Due    COVID-19 Vaccine (7 - 2023-24 season) 02/03/2024    LIPID  08/24/2024    MEDICARE ANNUAL WELLNESS VISIT  08/24/2024    INFLUENZA VACCINE (1) 09/01/2024    MAMMO SCREENING  11/03/2024    FALL RISK ASSESSMENT  08/28/2025    DTAP/TDAP/TD IMMUNIZATION (3 - Td or Tdap) 04/04/2026    GLUCOSE  08/24/2026    ADVANCE CARE PLANNING  08/28/2029    DEXA  09/21/2037    HEPATITIS C SCREENING  Completed    Pneumococcal Vaccine: 65+ Years  Completed    ZOSTER IMMUNIZATION  Completed    RSV VACCINE (Pregnancy & 60+)  Completed    HPV IMMUNIZATION  Aged Out    MENINGITIS IMMUNIZATION  Aged Out    RSV MONOCLONAL ANTIBODY  Aged Out    COLORECTAL CANCER SCREENING  Discontinued         Review of Systems  Constitutional, HEENT, cardiovascular, pulmonary, GI, , musculoskeletal, neuro, skin, endocrine and psych systems are negative, except as otherwise noted.     Objective    Exam  BP (!) 140/72 (BP Location: Right arm, Patient Position: Sitting, Cuff Size: Adult Regular)   Pulse 78   Wt 61.7 kg (136 lb)   SpO2 98%   BMI 25.91 kg/m     Estimated body mass index is 25.91 kg/m  as calculated from the following:    Height as of 4/30/24: 1.543 m (5' 0.75\").    Weight as of this encounter: 61.7 kg (136 lb).    Physical Exam  GENERAL: alert and no distress  EYES: Eyes grossly normal to inspection, PERRL and conjunctivae and sclerae normal  HENT: ear canals and TM's normal, nose and mouth without ulcers or lesions  NECK: no adenopathy, no asymmetry, masses, or scars  RESP: lungs clear to auscultation - no rales, rhonchi or " wheezes  BREAST: normal without masses, tenderness or nipple discharge and no palpable axillary masses or adenopathy  CV: regular rate and rhythm, normal S1 S2, no S3 or S4, no murmur, click or rub, no peripheral edema  ABDOMEN: soft, nontender, no hepatosplenomegaly, no masses and bowel sounds normal  MS: no gross musculoskeletal defects noted, no edema  SKIN: no suspicious lesions or rashes  NEURO: Normal strength and tone, mentation intact and speech normal  PSYCH: mentation appears normal, affect normal/bright         8/28/2024   Mini Cog   Clock Draw Score 2 Normal   3 Item Recall 3 objects recalled   Mini Cog Total Score 5               Signed Electronically by: Gabriel Robles MD

## 2024-11-20 ENCOUNTER — TRANSFERRED RECORDS (OUTPATIENT)
Dept: FAMILY MEDICINE | Facility: CLINIC | Age: 79
End: 2024-11-20

## 2025-03-01 ENCOUNTER — HEALTH MAINTENANCE LETTER (OUTPATIENT)
Age: 80
End: 2025-03-01

## 2025-03-17 DIAGNOSIS — N91.2 ABSENCE OF MENSTRUATION: ICD-10-CM

## 2025-03-17 NOTE — TELEPHONE ENCOUNTER
Med: raloxifene (EVISTA) 60 MG tablet     LOV (related): 8/24/2023 AWV- osteopenia      Due for F/U around: overdue    Next Appt: none    Last Bone dexa 9/21/2022

## 2025-03-18 RX ORDER — RALOXIFENE HYDROCHLORIDE 60 MG/1
TABLET, FILM COATED ORAL
Qty: 90 TABLET | Refills: 3 | Status: SHIPPED | OUTPATIENT
Start: 2025-03-18

## 2025-04-01 DIAGNOSIS — G47.9 SLEEP DISORDER: ICD-10-CM

## 2025-04-01 RX ORDER — TRAZODONE HYDROCHLORIDE 50 MG/1
50 TABLET ORAL AT BEDTIME
Qty: 90 TABLET | Refills: 1 | Status: SHIPPED | OUTPATIENT
Start: 2025-04-01

## 2025-06-21 DIAGNOSIS — E78.00 HYPERCHOLESTEREMIA: ICD-10-CM

## 2025-06-22 RX ORDER — SIMVASTATIN 40 MG
40 TABLET ORAL AT BEDTIME
Qty: 90 TABLET | Refills: 3 | Status: SHIPPED | OUTPATIENT
Start: 2025-06-22

## (undated) RX ORDER — FENTANYL CITRATE 50 UG/ML
INJECTION, SOLUTION INTRAMUSCULAR; INTRAVENOUS
Status: DISPENSED
Start: 2018-10-23